# Patient Record
Sex: FEMALE | Race: WHITE | NOT HISPANIC OR LATINO | Employment: FULL TIME | URBAN - METROPOLITAN AREA
[De-identification: names, ages, dates, MRNs, and addresses within clinical notes are randomized per-mention and may not be internally consistent; named-entity substitution may affect disease eponyms.]

---

## 2018-08-25 ENCOUNTER — HOSPITAL ENCOUNTER (EMERGENCY)
Facility: HOSPITAL | Age: 43
Discharge: HOME | End: 2018-08-26
Attending: EMERGENCY MEDICINE
Payer: COMMERCIAL

## 2018-08-25 DIAGNOSIS — N30.00 ACUTE CYSTITIS WITHOUT HEMATURIA: Primary | ICD-10-CM

## 2018-08-25 DIAGNOSIS — E86.0 DEHYDRATION: ICD-10-CM

## 2018-08-25 DIAGNOSIS — D64.9 ANEMIA, UNSPECIFIED TYPE: ICD-10-CM

## 2018-08-25 DIAGNOSIS — K52.9 GASTROENTERITIS: ICD-10-CM

## 2018-08-25 LAB
ALBUMIN SERPL BCP-MCNC: 2.8 G/DL
ALP SERPL-CCNC: 114 U/L
ALT SERPL W/O P-5'-P-CCNC: 17 U/L
ANION GAP SERPL CALC-SCNC: 13 MMOL/L
ANISOCYTOSIS BLD QL SMEAR: SLIGHT
AST SERPL-CCNC: 19 U/L
BACTERIA #/AREA URNS AUTO: ABNORMAL /HPF
BASOPHILS # BLD AUTO: 0.03 K/UL
BASOPHILS NFR BLD: 0.2 %
BILIRUB SERPL-MCNC: 2.2 MG/DL
BILIRUB UR QL STRIP: NEGATIVE
BUN SERPL-MCNC: 12 MG/DL
CALCIUM SERPL-MCNC: 9 MG/DL
CHLORIDE SERPL-SCNC: 95 MMOL/L
CLARITY UR REFRACT.AUTO: ABNORMAL
CO2 SERPL-SCNC: 19 MMOL/L
COLOR UR AUTO: ABNORMAL
CREAT SERPL-MCNC: 1.3 MG/DL
DACRYOCYTES BLD QL SMEAR: ABNORMAL
DIFFERENTIAL METHOD: ABNORMAL
EOSINOPHIL # BLD AUTO: 0.1 K/UL
EOSINOPHIL NFR BLD: 0.3 %
ERYTHROCYTE [DISTWIDTH] IN BLOOD BY AUTOMATED COUNT: 20.1 %
EST. GFR  (AFRICAN AMERICAN): 58.9 ML/MIN/1.73 M^2
EST. GFR  (NON AFRICAN AMERICAN): 51.1 ML/MIN/1.73 M^2
GLUCOSE SERPL-MCNC: 160 MG/DL
GLUCOSE UR QL STRIP: NEGATIVE
HCT VFR BLD AUTO: 25.5 %
HGB BLD-MCNC: 7.3 G/DL
HGB UR QL STRIP: ABNORMAL
HYALINE CASTS UR QL AUTO: 0 /LPF
HYPOCHROMIA BLD QL SMEAR: ABNORMAL
KETONES UR QL STRIP: NEGATIVE
LACTATE SERPL-SCNC: 1.9 MMOL/L
LEUKOCYTE ESTERASE UR QL STRIP: ABNORMAL
LYMPHOCYTES # BLD AUTO: 0.9 K/UL
LYMPHOCYTES NFR BLD: 5.4 %
MCH RBC QN AUTO: 15.7 PG
MCHC RBC AUTO-ENTMCNC: 28.6 G/DL
MCV RBC AUTO: 55 FL
MICROSCOPIC COMMENT: ABNORMAL
MONOCYTES # BLD AUTO: 1.4 K/UL
MONOCYTES NFR BLD: 8.5 %
NEUTROPHILS # BLD AUTO: 13.5 K/UL
NEUTROPHILS NFR BLD: 85.6 %
NITRITE UR QL STRIP: POSITIVE
OVALOCYTES BLD QL SMEAR: ABNORMAL
PH UR STRIP: 7 [PH] (ref 5–8)
PLATELET # BLD AUTO: 250 K/UL
PLATELET BLD QL SMEAR: ABNORMAL
PMV BLD AUTO: ABNORMAL FL
POIKILOCYTOSIS BLD QL SMEAR: SLIGHT
POLYCHROMASIA BLD QL SMEAR: ABNORMAL
POTASSIUM SERPL-SCNC: 3.8 MMOL/L
PROT SERPL-MCNC: 6.7 G/DL
PROT UR QL STRIP: ABNORMAL
RBC # BLD AUTO: 4.65 M/UL
RBC #/AREA URNS AUTO: 0 /HPF (ref 0–4)
SODIUM SERPL-SCNC: 127 MMOL/L
SP GR UR STRIP: <=1.005 (ref 1–1.03)
SPHEROCYTES BLD QL SMEAR: ABNORMAL
URN SPEC COLLECT METH UR: ABNORMAL
UROBILINOGEN UR STRIP-ACNC: <2 EU/DL
WBC # BLD AUTO: 15.84 K/UL
WBC #/AREA URNS AUTO: >100 /HPF (ref 0–5)

## 2018-08-25 PROCEDURE — 81000 URINALYSIS NONAUTO W/SCOPE: CPT

## 2018-08-25 PROCEDURE — 80053 COMPREHEN METABOLIC PANEL: CPT

## 2018-08-25 PROCEDURE — 85025 COMPLETE CBC W/AUTO DIFF WBC: CPT

## 2018-08-25 PROCEDURE — 83605 ASSAY OF LACTIC ACID: CPT

## 2018-08-25 PROCEDURE — 63600175 PHARM REV CODE 636 W HCPCS: Performed by: EMERGENCY MEDICINE

## 2018-08-25 PROCEDURE — 25000003 PHARM REV CODE 250: Performed by: EMERGENCY MEDICINE

## 2018-08-25 PROCEDURE — 99284 EMERGENCY DEPT VISIT MOD MDM: CPT | Mod: 25

## 2018-08-25 PROCEDURE — 87077 CULTURE AEROBIC IDENTIFY: CPT

## 2018-08-25 PROCEDURE — 96365 THER/PROPH/DIAG IV INF INIT: CPT

## 2018-08-25 PROCEDURE — 87040 BLOOD CULTURE FOR BACTERIA: CPT

## 2018-08-25 PROCEDURE — 96376 TX/PRO/DX INJ SAME DRUG ADON: CPT

## 2018-08-25 PROCEDURE — 96375 TX/PRO/DX INJ NEW DRUG ADDON: CPT

## 2018-08-25 PROCEDURE — 96361 HYDRATE IV INFUSION ADD-ON: CPT

## 2018-08-25 PROCEDURE — 85610 PROTHROMBIN TIME: CPT

## 2018-08-25 PROCEDURE — 87186 SC STD MICRODIL/AGAR DIL: CPT

## 2018-08-25 RX ORDER — ACETAMINOPHEN 500 MG
1000 TABLET ORAL
Status: DISCONTINUED | OUTPATIENT
Start: 2018-08-25 | End: 2018-08-25

## 2018-08-25 RX ORDER — IBUPROFEN 200 MG
400 TABLET ORAL
Status: COMPLETED | OUTPATIENT
Start: 2018-08-25 | End: 2018-08-25

## 2018-08-25 RX ORDER — ONDANSETRON 2 MG/ML
4 INJECTION INTRAMUSCULAR; INTRAVENOUS
Status: COMPLETED | OUTPATIENT
Start: 2018-08-25 | End: 2018-08-25

## 2018-08-25 RX ORDER — ONDANSETRON 2 MG/ML
4 INJECTION INTRAMUSCULAR; INTRAVENOUS
Status: COMPLETED | OUTPATIENT
Start: 2018-08-26 | End: 2018-08-26

## 2018-08-25 RX ORDER — ACETAMINOPHEN 325 MG/1
975 TABLET ORAL
Status: COMPLETED | OUTPATIENT
Start: 2018-08-25 | End: 2018-08-25

## 2018-08-25 RX ORDER — LEVOFLOXACIN 500 MG/1
250 TABLET, FILM COATED ORAL DAILY
Qty: 5 TABLET | Refills: 0 | Status: SHIPPED | OUTPATIENT
Start: 2018-08-25 | End: 2018-08-30

## 2018-08-25 RX ADMIN — IBUPROFEN 400 MG: 200 TABLET, FILM COATED ORAL at 10:08

## 2018-08-25 RX ADMIN — SODIUM CHLORIDE 3321 ML: 0.9 INJECTION, SOLUTION INTRAVENOUS at 07:08

## 2018-08-25 RX ADMIN — ACETAMINOPHEN 975 MG: 325 TABLET, FILM COATED ORAL at 08:08

## 2018-08-25 RX ADMIN — ONDANSETRON 4 MG: 2 INJECTION, SOLUTION INTRAMUSCULAR; INTRAVENOUS at 08:08

## 2018-08-25 RX ADMIN — CEFTRIAXONE 1 G: 1 INJECTION, SOLUTION INTRAVENOUS at 10:08

## 2018-08-26 ENCOUNTER — TELEPHONE (OUTPATIENT)
Dept: EMERGENCY MEDICINE | Facility: HOSPITAL | Age: 43
End: 2018-08-26

## 2018-08-26 VITALS
HEART RATE: 82 BPM | TEMPERATURE: 99 F | HEIGHT: 70 IN | OXYGEN SATURATION: 95 % | WEIGHT: 244.06 LBS | SYSTOLIC BLOOD PRESSURE: 116 MMHG | RESPIRATION RATE: 18 BRPM | DIASTOLIC BLOOD PRESSURE: 56 MMHG | BODY MASS INDEX: 34.94 KG/M2

## 2018-08-26 LAB
INR PPP: 1.1
PROTHROMBIN TIME: 12 SEC

## 2018-08-26 PROCEDURE — 63600175 PHARM REV CODE 636 W HCPCS: Performed by: EMERGENCY MEDICINE

## 2018-08-26 RX ORDER — ONDANSETRON 4 MG/1
4 TABLET, ORALLY DISINTEGRATING ORAL EVERY 6 HOURS PRN
Qty: 12 TABLET | Refills: 0 | Status: SHIPPED | OUTPATIENT
Start: 2018-08-26

## 2018-08-26 RX ADMIN — ONDANSETRON 4 MG: 2 INJECTION, SOLUTION INTRAMUSCULAR; INTRAVENOUS at 12:08

## 2018-08-28 LAB
BACTERIA BLD CULT: NORMAL

## 2018-08-29 NOTE — ED PROVIDER NOTES
Encounter Date: 8/25/2018       History     Chief Complaint   Patient presents with    Fever     patient has been running fever since wednesday with nausea, vomiting and diarrhea.   states pt has not been getting out of the bed or eating anything over last few days.       Patient currently presents with concerns regarding fever.  Onset noted Wed.  Patient has not taken any antipyretics prior to arrival.  Sinus congestion is not reported.  Cough is not reported.  Patient reports associated nausea/vomiting and reports diarrhea.  Abdominal pain is not reported.  Urinary complaints are not present.  Significant fatigue and generalized weakness noted.  Little to no PO intake noted.          Review of patient's allergies indicates:   Allergen Reactions    Codeine      Past Medical History:   Diagnosis Date    Chronic back pain     Hypertension     Migraines     Obese     Spinal stenosis      Past Surgical History:   Procedure Laterality Date    COLPOSCOPY       Family History   Problem Relation Age of Onset    Dementia Mother     Frontotemporal dementia Mother     Lupus Mother     Vasculitis Mother     Vasculitis Father     Diabetes Father     Lupus Father      Social History     Tobacco Use    Smoking status: Current Every Day Smoker     Packs/day: 0.50     Types: Cigarettes    Smokeless tobacco: Never Used   Substance Use Topics    Alcohol use: No    Drug use: No     Review of Systems   Constitutional: Positive for fatigue and fever. Negative for chills.   HENT: Negative for congestion and rhinorrhea.    Respiratory: Negative for cough, chest tightness, shortness of breath and wheezing.    Cardiovascular: Negative for chest pain, palpitations and leg swelling.   Gastrointestinal: Positive for diarrhea, nausea and vomiting. Negative for abdominal pain and constipation.   Genitourinary: Negative for dysuria, frequency, urgency, vaginal bleeding and vaginal discharge.   Skin: Negative for color  "change and rash.   Allergic/Immunologic: Negative for immunocompromised state.   Neurological: Positive for weakness. Negative for dizziness and numbness.   Hematological: Negative for adenopathy. Does not bruise/bleed easily.   All other systems reviewed and are negative.      Physical Exam     Initial Vitals [08/25/18 1849]   BP Pulse Resp Temp SpO2   133/60 (!) 115 12 (!) 103.2 °F (39.6 °C) 97 %      MAP       --         Vitals:    08/25/18 1849 08/25/18 1916 08/25/18 2000 08/25/18 2030   BP: 133/60  125/61 (!) 112/53   Pulse: (!) 115 109 101 101   Resp: 12  16 18   Temp: (!) 103.2 °F (39.6 °C)      TempSrc: Oral      SpO2: 97%  (!) 94% 96%   Weight: 110.7 kg (244 lb 0.8 oz)      Height: 5' 10" (1.778 m)       08/25/18 2059 08/25/18 2101 08/25/18 2200 08/25/18 2202   BP:  (!) 115/57 (!) 125/57    Pulse:  109 94    Resp:  18 20    Temp: (!) 102.5 °F (39.2 °C)   (!) 101.5 °F (38.6 °C)   TempSrc:    Oral   SpO2:  99% 100%    Weight:       Height:        08/25/18 2237 08/25/18 2317 08/26/18 0000   BP:   (!) 116/56   Pulse:   82   Resp:   18   Temp: (!) 101.5 °F (38.6 °C) 99.3 °F (37.4 °C)    TempSrc:  Oral    SpO2:   95%   Weight:      Height:            Physical Exam    Nursing note and vitals reviewed.  Constitutional: She appears well-developed and well-nourished. She is not diaphoretic. No distress.   HENT:   Head: Normocephalic and atraumatic.   Right Ear: External ear normal.   Left Ear: External ear normal.   Nose: Nose normal.   Mouth/Throat: Oropharynx is clear and moist.   Eyes: Conjunctivae and EOM are normal. Pupils are equal, round, and reactive to light. No scleral icterus.   Neck: Neck supple. No tracheal deviation present. No JVD present.   Cardiovascular: Regular rhythm, normal heart sounds and intact distal pulses. Tachycardia present.  Exam reveals no gallop and no friction rub.    No murmur heard.  Pulmonary/Chest: Breath sounds normal. No respiratory distress. She has no wheezes. She has no " rhonchi. She has no rales.   Abdominal: Soft. Bowel sounds are normal. She exhibits no distension. There is no tenderness.   Musculoskeletal: Normal range of motion. She exhibits no edema.   Neurological: She is alert and oriented to person, place, and time. She has normal strength. No cranial nerve deficit or sensory deficit.   Skin: Skin is warm and dry. No rash noted.   Psychiatric: She has a normal mood and affect. Her behavior is normal.         ED Course   Procedures  Labs Reviewed   COMPREHENSIVE METABOLIC PANEL - Abnormal; Notable for the following components:       Result Value    Sodium 127 (*)     CO2 19 (*)     Glucose 160 (*)     Albumin 2.8 (*)     Total Bilirubin 2.2 (*)     eGFR if  58.9 (*)     eGFR if non  51.1 (*)     All other components within normal limits   CBC W/ AUTO DIFFERENTIAL - Abnormal; Notable for the following components:    WBC 15.84 (*)     Hemoglobin 7.3 (*)     Hematocrit 25.5 (*)     MCV 55 (*)     MCH 15.7 (*)     MCHC 28.6 (*)     RDW 20.1 (*)     Gran # (ANC) 13.5 (*)     Lymph # 0.9 (*)     Mono # 1.4 (*)     Gran% 85.6 (*)     Lymph% 5.4 (*)     All other components within normal limits   URINALYSIS - Abnormal; Notable for the following components:    Color, UA Red (*)     Appearance, UA Cloudy (*)     Specific Gravity, UA <=1.005 (*)     Protein, UA 2+ (*)     Occult Blood UA 2+ (*)     Nitrite, UA Positive (*)     Leukocytes, UA 3+ (*)     All other components within normal limits   URINALYSIS MICROSCOPIC - Abnormal; Notable for the following components:    WBC, UA >100 (*)     Bacteria, UA Many (*)     All other components within normal limits   CULTURE, BLOOD   CULTURE, BLOOD   LACTIC ACID, PLASMA   PROTIME-INR          Imaging Results          X-Ray Chest AP Portable (Final result)  Result time 08/25/18 19:35:14    Final result by Jeb Park MD (08/25/18 19:35:14)                 Impression:      No acute findings.  No  significant change since 09/17/2016.      Electronically signed by: Jeb Park MD  Date:    08/25/2018  Time:    19:35             Narrative:    EXAMINATION:  XR CHEST AP PORTABLE    CLINICAL HISTORY:  SIRS;    COMPARISON:  09/17/2016.    FINDINGS:  Lungs are clear.  Cardiac silhouette is enlarged.  Previously identified left-sided PICC line has been removed.  No significant bony findings.                                 Medical Decision Making:   ED Management:  All findings were reviewed with the patient/family in detail along with the diagnosis of anemia, UTI, and gastroenteritis with resultant dehydration.  At present she is feeling better with resolved dizziness and nausea.  I see no indication of an emergent process beyond that addressed during our encounter but have duly counseled the patient/family regarding the need for prompt follow-up as well as the indications that should prompt immediate return to the emergency room should new or worrisome developments occur.  The patient/family communicates understanding of all this information and all remaining questions and concerns were addressed at this time.                          Clinical Impression:   The primary encounter diagnosis was Acute cystitis without hematuria. Diagnoses of Dehydration, Gastroenteritis, and Anemia, unspecified type were also pertinent to this visit.                             Jagdish Jones MD  08/29/18 0524

## 2018-08-31 LAB — BACTERIA BLD CULT: NORMAL

## 2021-12-22 ENCOUNTER — HOSPITAL ENCOUNTER (EMERGENCY)
Facility: HOSPITAL | Age: 46
Discharge: HOME | End: 2021-12-22
Attending: EMERGENCY MEDICINE
Payer: COMMERCIAL

## 2021-12-22 VITALS
HEIGHT: 70 IN | DIASTOLIC BLOOD PRESSURE: 79 MMHG | SYSTOLIC BLOOD PRESSURE: 158 MMHG | RESPIRATION RATE: 18 BRPM | TEMPERATURE: 98 F | WEIGHT: 257.5 LBS | BODY MASS INDEX: 36.86 KG/M2 | HEART RATE: 97 BPM | OXYGEN SATURATION: 98 %

## 2021-12-22 DIAGNOSIS — S83.92XA SPRAIN OF LEFT KNEE, UNSPECIFIED LIGAMENT, INITIAL ENCOUNTER: ICD-10-CM

## 2021-12-22 DIAGNOSIS — R07.81 RIB PAIN ON RIGHT SIDE: ICD-10-CM
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
## 2021-12-22 DIAGNOSIS — S89.92XA LEFT KNEE INJURY: ICD-10-CM

## 2021-12-22 PROCEDURE — 99284 EMERGENCY DEPT VISIT MOD MDM: CPT | Mod: 25,ER

## 2021-12-22 PROCEDURE — 29505 APPLICATION LONG LEG SPLINT: CPT | Mod: LT,ER

## 2021-12-22 RX ORDER — TRAMADOL HYDROCHLORIDE 50 MG/1
50 TABLET ORAL EVERY 6 HOURS PRN
Qty: 12 TABLET | Refills: 0 | Status: SHIPPED | OUTPATIENT
Start: 2021-12-22
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
## 2024-10-21 ENCOUNTER — TELEPHONE (OUTPATIENT)
Dept: INTERNAL MEDICINE | Facility: CLINIC | Age: 49
End: 2024-10-21
Payer: COMMERCIAL

## 2024-10-21 NOTE — TELEPHONE ENCOUNTER
Spoke with the patient stated that she needed to establish care with a new primary care doctor. The patient was informed that Dr Brown did not have any sooner appointment. The patient deny the appointment.

## 2024-10-21 NOTE — TELEPHONE ENCOUNTER
----- Message from Roya sent at 10/21/2024 11:07 AM CDT -----  Type:  Sooner Appointment Request    Caller is requesting a sooner appointment.  Caller declined first available appointment listed below.  Caller will not accept being placed on the waitlist and is requesting a message be sent to doctor.  Name of Caller:Delicia  When is the first available appointment?n/a  Symptoms:Get Est  Would the patient rather a call back or a response via [facility]? Callback  Best Call Back Number:[phone]  Additional Information: [facility]

## 2025-07-20 ENCOUNTER — HOSPITAL ENCOUNTER (INPATIENT)
Facility: HOSPITAL | Age: 50
LOS: 3 days | Discharge: LEFT BEFORE DISCHARGE | DRG: 871 | End: 2025-07-23
Attending: EMERGENCY MEDICINE | Admitting: SPECIALIST
Payer: COMMERCIAL

## 2025-07-20 DIAGNOSIS — I95.9 HYPOTENSION, UNSPECIFIED HYPOTENSION TYPE: ICD-10-CM

## 2025-07-20 DIAGNOSIS — D64.9 ANEMIA, UNSPECIFIED TYPE: ICD-10-CM

## 2025-07-20 DIAGNOSIS — N17.9 AKI (ACUTE KIDNEY INJURY): Primary | ICD-10-CM

## 2025-07-20 DIAGNOSIS — N30.00 ACUTE CYSTITIS WITHOUT HEMATURIA: ICD-10-CM

## 2025-07-20 DIAGNOSIS — Z13.6 SCREENING FOR CARDIOVASCULAR CONDITION: ICD-10-CM

## 2025-07-20 DIAGNOSIS — A41.9 SEPSIS, DUE TO UNSPECIFIED ORGANISM, UNSPECIFIED WHETHER ACUTE ORGAN DYSFUNCTION PRESENT: ICD-10-CM

## 2025-07-20 PROBLEM — F17.200 TOBACCO DEPENDENCY: Status: ACTIVE | Noted: 2025-07-20

## 2025-07-20 PROBLEM — G89.4 CHRONIC PAIN SYNDROME: Status: ACTIVE | Noted: 2025-07-20

## 2025-07-20 PROBLEM — E27.40 ADRENAL INSUFFICIENCY: Status: ACTIVE | Noted: 2025-07-20

## 2025-07-20 PROBLEM — N10 ACUTE PYELONEPHRITIS: Status: ACTIVE | Noted: 2025-07-20

## 2025-07-20 LAB
ABO + RH BLD: NORMAL
ABORH RETYPE: NORMAL
ABSOLUTE EOSINOPHIL (OHS): 0 K/UL
ABSOLUTE EOSINOPHIL (OHS): 0.04 K/UL
ABSOLUTE MONOCYTE (OHS): 0.48 K/UL (ref 0.3–1)
ABSOLUTE MONOCYTE (OHS): 0.57 K/UL (ref 0.3–1)
ABSOLUTE NEUTROPHIL COUNT (OHS): 17.14 K/UL (ref 1.8–7.7)
ABSOLUTE NEUTROPHIL COUNT (OHS): 17.96 K/UL (ref 1.8–7.7)
ALBUMIN SERPL BCP-MCNC: 2.7 G/DL (ref 3.5–5.2)
ALLENS TEST: ABNORMAL
ALP SERPL-CCNC: 107 UNIT/L (ref 40–150)
ALT SERPL W/O P-5'-P-CCNC: 35 UNIT/L (ref 10–44)
ANION GAP (OHS): 16 MMOL/L (ref 8–16)
ANISOCYTOSIS BLD QL SMEAR: NORMAL
AST SERPL-CCNC: 36 UNIT/L (ref 11–45)
BACTERIA #/AREA URNS HPF: ABNORMAL /HPF
BASOPHILS # BLD AUTO: 0.02 K/UL
BASOPHILS # BLD AUTO: 0.04 K/UL
BASOPHILS NFR BLD AUTO: 0.1 %
BASOPHILS NFR BLD AUTO: 0.2 %
BILIRUB SERPL-MCNC: 0.8 MG/DL (ref 0.1–1)
BILIRUB UR QL STRIP.AUTO: ABNORMAL
BLD PROD TYP BPU: NORMAL
BLOOD UNIT EXPIRATION DATE: NORMAL
BLOOD UNIT TYPE CODE: 5100
BNP SERPL-MCNC: 35 PG/ML (ref 0–99)
BUN SERPL-MCNC: 33 MG/DL (ref 6–20)
CALCIUM SERPL-MCNC: 10.1 MG/DL (ref 8.7–10.5)
CHLORIDE SERPL-SCNC: 95 MMOL/L (ref 95–110)
CLARITY UR: CLEAR
CO2 SERPL-SCNC: 19 MMOL/L (ref 23–29)
COLOR UR AUTO: YELLOW
CORTIS SERPL-MCNC: 15.1 UG/DL
CREAT SERPL-MCNC: 3.4 MG/DL (ref 0.5–1.4)
CROSSMATCH INTERPRETATION: NORMAL
DACRYOCYTES BLD QL SMEAR: NORMAL
DELSYS: ABNORMAL
DISPENSE STATUS: NORMAL
ERYTHROCYTE [DISTWIDTH] IN BLOOD BY AUTOMATED COUNT: 20.4 % (ref 11.5–14.5)
ERYTHROCYTE [DISTWIDTH] IN BLOOD BY AUTOMATED COUNT: 22.1 % (ref 11.5–14.5)
GFR SERPLBLD CREATININE-BSD FMLA CKD-EPI: 16 ML/MIN/1.73/M2
GLUCOSE SERPL-MCNC: 148 MG/DL (ref 70–110)
GLUCOSE UR QL STRIP: NEGATIVE
HCO3 UR-SCNC: 19.6 MMOL/L (ref 24–28)
HCT VFR BLD AUTO: 22.2 % (ref 37–48.5)
HCT VFR BLD AUTO: 22.2 % (ref 37–48.5)
HCV AB SERPL QL IA: NEGATIVE
HGB BLD-MCNC: 6.4 GM/DL (ref 12–16)
HGB BLD-MCNC: 6.4 GM/DL (ref 12–16)
HGB UR QL STRIP: NEGATIVE
HIV 1+2 AB+HIV1 P24 AG SERPL QL IA: NEGATIVE
HOLD SPECIMEN: NORMAL
HYPOCHROMIA BLD QL SMEAR: NORMAL
IMM GRANULOCYTES # BLD AUTO: 0.37 K/UL (ref 0–0.04)
IMM GRANULOCYTES # BLD AUTO: 0.48 K/UL (ref 0–0.04)
IMM GRANULOCYTES NFR BLD AUTO: 2 % (ref 0–0.5)
IMM GRANULOCYTES NFR BLD AUTO: 2.4 % (ref 0–0.5)
INDIRECT COOMBS: NORMAL
KETONES UR QL STRIP: NEGATIVE
LACTATE SERPL-SCNC: 1.1 MMOL/L (ref 0.5–2.2)
LACTATE SERPL-SCNC: 1.7 MMOL/L (ref 0.5–2.2)
LACTATE SERPL-SCNC: 2.7 MMOL/L (ref 0.5–2.2)
LEUKOCYTE ESTERASE UR QL STRIP: ABNORMAL
LYMPHOCYTES # BLD AUTO: 0.35 K/UL (ref 1–4.8)
LYMPHOCYTES # BLD AUTO: 1 K/UL (ref 1–4.8)
MCH RBC QN AUTO: 17.3 PG (ref 27–31)
MCH RBC QN AUTO: 18.3 PG (ref 27–31)
MCHC RBC AUTO-ENTMCNC: 28.8 G/DL (ref 32–36)
MCHC RBC AUTO-ENTMCNC: 28.8 G/DL (ref 32–36)
MCV RBC AUTO: 60 FL (ref 82–98)
MCV RBC AUTO: 63 FL (ref 82–98)
MICROSCOPIC COMMENT: ABNORMAL
MODE: ABNORMAL
NITRITE UR QL STRIP: POSITIVE
NUCLEATED RBC (/100WBC) (OHS): 0 /100 WBC
NUCLEATED RBC (/100WBC) (OHS): 0 /100 WBC
OHS QRS DURATION: 80 MS
OHS QTC CALCULATION: 423 MS
OVALOCYTES BLD QL SMEAR: NORMAL
PCO2 BLDA: 41 MMHG (ref 35–45)
PH SMN: 7.29 [PH] (ref 7.35–7.45)
PH UR STRIP: 6 [PH]
PLATELET # BLD AUTO: 347 K/UL (ref 150–450)
PLATELET # BLD AUTO: 416 K/UL (ref 150–450)
PLATELET BLD QL SMEAR: NORMAL
PMV BLD AUTO: 10.9 FL (ref 9.2–12.9)
PMV BLD AUTO: 9.8 FL (ref 9.2–12.9)
PO2 BLDA: 32 MMHG (ref 40–60)
POC BE: -7 MMOL/L (ref -2–2)
POC SATURATED O2: 55 % (ref 95–100)
POIKILOCYTOSIS BLD QL SMEAR: SLIGHT
POTASSIUM SERPL-SCNC: 4 MMOL/L (ref 3.5–5.1)
PROCALCITONIN SERPL-MCNC: 22.96 NG/ML
PROT SERPL-MCNC: 7 GM/DL (ref 6–8.4)
PROT UR QL STRIP: ABNORMAL
RBC # BLD AUTO: 3.5 M/UL (ref 4–5.4)
RBC # BLD AUTO: 3.69 M/UL (ref 4–5.4)
RBC #/AREA URNS HPF: 1 /HPF (ref 0–4)
RELATIVE EOSINOPHIL (OHS): 0 %
RELATIVE EOSINOPHIL (OHS): 0.2 %
RELATIVE LYMPHOCYTE (OHS): 1.9 % (ref 18–48)
RELATIVE LYMPHOCYTE (OHS): 5 % (ref 18–48)
RELATIVE MONOCYTE (OHS): 2.4 % (ref 4–15)
RELATIVE MONOCYTE (OHS): 3.1 % (ref 4–15)
RELATIVE NEUTROPHIL (OHS): 89.8 % (ref 38–73)
RELATIVE NEUTROPHIL (OHS): 92.9 % (ref 38–73)
RH BLD: NORMAL
SAMPLE: ABNORMAL
SITE: ABNORMAL
SODIUM SERPL-SCNC: 130 MMOL/L (ref 136–145)
SP GR UR STRIP: 1.01
SPECIMEN OUTDATE: NORMAL
TROPONIN I SERPL DL<=0.01 NG/ML-MCNC: 0.01 NG/ML
UNIT NUMBER: NORMAL
UROBILINOGEN UR STRIP-ACNC: NEGATIVE EU/DL
WBC # BLD AUTO: 18.45 K/UL (ref 3.9–12.7)
WBC # BLD AUTO: 20 K/UL (ref 3.9–12.7)
WBC #/AREA URNS HPF: 12 /HPF (ref 0–5)

## 2025-07-20 PROCEDURE — 85025 COMPLETE CBC W/AUTO DIFF WBC: CPT | Performed by: SPECIALIST

## 2025-07-20 PROCEDURE — 99900035 HC TECH TIME PER 15 MIN (STAT): Mod: ER

## 2025-07-20 PROCEDURE — 36600 WITHDRAWAL OF ARTERIAL BLOOD: CPT | Mod: ER

## 2025-07-20 PROCEDURE — 25000003 PHARM REV CODE 250: Mod: ER | Performed by: EMERGENCY MEDICINE

## 2025-07-20 PROCEDURE — 36430 TRANSFUSION BLD/BLD COMPNT: CPT

## 2025-07-20 PROCEDURE — 83605 ASSAY OF LACTIC ACID: CPT | Mod: ER | Performed by: EMERGENCY MEDICINE

## 2025-07-20 PROCEDURE — S4991 NICOTINE PATCH NONLEGEND: HCPCS | Performed by: FAMILY MEDICINE

## 2025-07-20 PROCEDURE — 82803 BLOOD GASES ANY COMBINATION: CPT | Mod: ER

## 2025-07-20 PROCEDURE — 93010 ELECTROCARDIOGRAM REPORT: CPT | Mod: ,,, | Performed by: INTERNAL MEDICINE

## 2025-07-20 PROCEDURE — 87389 HIV-1 AG W/HIV-1&-2 AB AG IA: CPT | Performed by: EMERGENCY MEDICINE

## 2025-07-20 PROCEDURE — 84484 ASSAY OF TROPONIN QUANT: CPT | Performed by: EMERGENCY MEDICINE

## 2025-07-20 PROCEDURE — 87186 SC STD MICRODIL/AGAR DIL: CPT | Performed by: EMERGENCY MEDICINE

## 2025-07-20 PROCEDURE — 87088 URINE BACTERIA CULTURE: CPT | Performed by: EMERGENCY MEDICINE

## 2025-07-20 PROCEDURE — 36556 INSERT NON-TUNNEL CV CATH: CPT | Mod: RT

## 2025-07-20 PROCEDURE — 25000003 PHARM REV CODE 250

## 2025-07-20 PROCEDURE — 93005 ELECTROCARDIOGRAM TRACING: CPT | Mod: ER

## 2025-07-20 PROCEDURE — 86803 HEPATITIS C AB TEST: CPT | Performed by: EMERGENCY MEDICINE

## 2025-07-20 PROCEDURE — 51702 INSERT TEMP BLADDER CATH: CPT

## 2025-07-20 PROCEDURE — 96375 TX/PRO/DX INJ NEW DRUG ADDON: CPT

## 2025-07-20 PROCEDURE — 63600175 PHARM REV CODE 636 W HCPCS: Performed by: SPECIALIST

## 2025-07-20 PROCEDURE — 81003 URINALYSIS AUTO W/O SCOPE: CPT | Mod: ER | Performed by: EMERGENCY MEDICINE

## 2025-07-20 PROCEDURE — 83880 ASSAY OF NATRIURETIC PEPTIDE: CPT | Mod: ER | Performed by: EMERGENCY MEDICINE

## 2025-07-20 PROCEDURE — 27000221 HC OXYGEN, UP TO 24 HOURS: Mod: ER

## 2025-07-20 PROCEDURE — 86920 COMPATIBILITY TEST SPIN: CPT | Performed by: SPECIALIST

## 2025-07-20 PROCEDURE — 83605 ASSAY OF LACTIC ACID: CPT | Performed by: EMERGENCY MEDICINE

## 2025-07-20 PROCEDURE — 82962 GLUCOSE BLOOD TEST: CPT

## 2025-07-20 PROCEDURE — 96361 HYDRATE IV INFUSION ADD-ON: CPT

## 2025-07-20 PROCEDURE — P9016 RBC LEUKOCYTES REDUCED: HCPCS | Performed by: SPECIALIST

## 2025-07-20 PROCEDURE — 36415 COLL VENOUS BLD VENIPUNCTURE: CPT | Performed by: EMERGENCY MEDICINE

## 2025-07-20 PROCEDURE — 06H033Z INSERTION OF INFUSION DEVICE INTO INFERIOR VENA CAVA, PERCUTANEOUS APPROACH: ICD-10-PCS | Performed by: EMERGENCY MEDICINE

## 2025-07-20 PROCEDURE — 80053 COMPREHEN METABOLIC PANEL: CPT | Mod: ER | Performed by: EMERGENCY MEDICINE

## 2025-07-20 PROCEDURE — 84145 PROCALCITONIN (PCT): CPT | Mod: ER | Performed by: EMERGENCY MEDICINE

## 2025-07-20 PROCEDURE — 20000000 HC ICU ROOM

## 2025-07-20 PROCEDURE — 87154 CUL TYP ID BLD PTHGN 6+ TRGT: CPT | Performed by: EMERGENCY MEDICINE

## 2025-07-20 PROCEDURE — 99291 CRITICAL CARE FIRST HOUR: CPT

## 2025-07-20 PROCEDURE — 86901 BLOOD TYPING SEROLOGIC RH(D): CPT | Performed by: FAMILY MEDICINE

## 2025-07-20 PROCEDURE — 82533 TOTAL CORTISOL: CPT | Performed by: EMERGENCY MEDICINE

## 2025-07-20 PROCEDURE — 25000003 PHARM REV CODE 250: Performed by: SPECIALIST

## 2025-07-20 PROCEDURE — 96374 THER/PROPH/DIAG INJ IV PUSH: CPT

## 2025-07-20 PROCEDURE — 63600175 PHARM REV CODE 636 W HCPCS: Mod: ER

## 2025-07-20 PROCEDURE — 85025 COMPLETE CBC W/AUTO DIFF WBC: CPT | Mod: ER | Performed by: EMERGENCY MEDICINE

## 2025-07-20 PROCEDURE — 86920 COMPATIBILITY TEST SPIN: CPT

## 2025-07-20 PROCEDURE — 63600175 PHARM REV CODE 636 W HCPCS: Mod: ER | Performed by: EMERGENCY MEDICINE

## 2025-07-20 PROCEDURE — 25000003 PHARM REV CODE 250: Performed by: FAMILY MEDICINE

## 2025-07-20 PROCEDURE — 94761 N-INVAS EAR/PLS OXIMETRY MLT: CPT | Mod: ER

## 2025-07-20 RX ORDER — MUPIROCIN 20 MG/G
OINTMENT TOPICAL 2 TIMES DAILY
Status: DISCONTINUED | OUTPATIENT
Start: 2025-07-20 | End: 2025-07-23 | Stop reason: HOSPADM

## 2025-07-20 RX ORDER — NALOXONE HYDROCHLORIDE 1 MG/ML
INJECTION INTRAMUSCULAR; INTRAVENOUS; SUBCUTANEOUS
Status: COMPLETED
Start: 2025-07-20 | End: 2025-07-20

## 2025-07-20 RX ORDER — NALOXONE HCL 0.4 MG/ML
1 VIAL (ML) INJECTION
Status: COMPLETED | OUTPATIENT
Start: 2025-07-20 | End: 2025-07-20

## 2025-07-20 RX ORDER — GABAPENTIN 100 MG/1
200 CAPSULE ORAL 3 TIMES DAILY
Status: DISCONTINUED | OUTPATIENT
Start: 2025-07-20 | End: 2025-07-23 | Stop reason: HOSPADM

## 2025-07-20 RX ORDER — FAMOTIDINE 20 MG/1
20 TABLET, FILM COATED ORAL DAILY
Status: DISCONTINUED | OUTPATIENT
Start: 2025-07-20 | End: 2025-07-23 | Stop reason: HOSPADM

## 2025-07-20 RX ORDER — CEFEPIME HYDROCHLORIDE 2 G/1
2 INJECTION, POWDER, FOR SOLUTION INTRAVENOUS
Status: COMPLETED | OUTPATIENT
Start: 2025-07-20 | End: 2025-07-20

## 2025-07-20 RX ORDER — ALUMINUM HYDROXIDE, MAGNESIUM HYDROXIDE, AND SIMETHICONE 1200; 120; 1200 MG/30ML; MG/30ML; MG/30ML
30 SUSPENSION ORAL
Status: DISCONTINUED | OUTPATIENT
Start: 2025-07-20 | End: 2025-07-23 | Stop reason: HOSPADM

## 2025-07-20 RX ORDER — SODIUM CHLORIDE 9 MG/ML
1000 INJECTION, SOLUTION INTRAVENOUS
Status: COMPLETED | OUTPATIENT
Start: 2025-07-20 | End: 2025-07-20

## 2025-07-20 RX ORDER — MORPHINE SULFATE 4 MG/ML
1 INJECTION, SOLUTION INTRAMUSCULAR; INTRAVENOUS EVERY 6 HOURS PRN
Status: DISCONTINUED | OUTPATIENT
Start: 2025-07-20 | End: 2025-07-23

## 2025-07-20 RX ORDER — IBUPROFEN 200 MG
1 TABLET ORAL DAILY
Status: DISCONTINUED | OUTPATIENT
Start: 2025-07-20 | End: 2025-07-23 | Stop reason: HOSPADM

## 2025-07-20 RX ORDER — HYDROCODONE BITARTRATE AND ACETAMINOPHEN 500; 5 MG/1; MG/1
TABLET ORAL
Status: DISCONTINUED | OUTPATIENT
Start: 2025-07-20 | End: 2025-07-20

## 2025-07-20 RX ORDER — SUCRALFATE 1 G/10ML
1 SUSPENSION ORAL EVERY 6 HOURS
Status: DISCONTINUED | OUTPATIENT
Start: 2025-07-20 | End: 2025-07-23 | Stop reason: HOSPADM

## 2025-07-20 RX ORDER — ALUMINUM HYDROXIDE, MAGNESIUM HYDROXIDE, AND SIMETHICONE 1200; 120; 1200 MG/30ML; MG/30ML; MG/30ML
30 SUSPENSION ORAL
Status: DISCONTINUED | OUTPATIENT
Start: 2025-07-20 | End: 2025-07-20 | Stop reason: SDUPTHER

## 2025-07-20 RX ORDER — SUCRALFATE 1 G/10ML
1 SUSPENSION ORAL EVERY 6 HOURS
Status: DISCONTINUED | OUTPATIENT
Start: 2025-07-20 | End: 2025-07-20 | Stop reason: SDUPTHER

## 2025-07-20 RX ORDER — HYDROCODONE BITARTRATE AND ACETAMINOPHEN 500; 5 MG/1; MG/1
TABLET ORAL
Status: DISCONTINUED | OUTPATIENT
Start: 2025-07-20 | End: 2025-07-23 | Stop reason: HOSPADM

## 2025-07-20 RX ORDER — HYDROCODONE BITARTRATE AND ACETAMINOPHEN 5; 325 MG/1; MG/1
1 TABLET ORAL EVERY 8 HOURS PRN
Refills: 0 | Status: DISCONTINUED | OUTPATIENT
Start: 2025-07-20 | End: 2025-07-23 | Stop reason: HOSPADM

## 2025-07-20 RX ORDER — LANOLIN ALCOHOL/MO/W.PET/CERES
1 CREAM (GRAM) TOPICAL DAILY
Status: DISCONTINUED | OUTPATIENT
Start: 2025-07-21 | End: 2025-07-23 | Stop reason: HOSPADM

## 2025-07-20 RX ORDER — MEROPENEM 500 MG/1
500 INJECTION, POWDER, FOR SOLUTION INTRAVENOUS
Status: DISCONTINUED | OUTPATIENT
Start: 2025-07-20 | End: 2025-07-21

## 2025-07-20 RX ORDER — DEXAMETHASONE SODIUM PHOSPHATE 4 MG/ML
6 INJECTION, SOLUTION INTRA-ARTICULAR; INTRALESIONAL; INTRAMUSCULAR; INTRAVENOUS; SOFT TISSUE
Status: COMPLETED | OUTPATIENT
Start: 2025-07-20 | End: 2025-07-20

## 2025-07-20 RX ADMIN — SODIUM CHLORIDE 1000 ML: 9 INJECTION, SOLUTION INTRAVENOUS at 09:07

## 2025-07-20 RX ADMIN — HYDROCORTISONE SODIUM SUCCINATE 100 MG: 100 INJECTION, POWDER, FOR SOLUTION INTRAMUSCULAR; INTRAVENOUS at 09:07

## 2025-07-20 RX ADMIN — DEXAMETHASONE SODIUM PHOSPHATE 6 MG: 4 INJECTION INTRA-ARTICULAR; INTRALESIONAL; INTRAMUSCULAR; INTRAVENOUS; SOFT TISSUE at 07:07

## 2025-07-20 RX ADMIN — ALUMINUM HYDROXIDE, MAGNESIUM HYDROXIDE, AND DIMETHICONE 30 ML: 200; 20; 200 SUSPENSION ORAL at 05:07

## 2025-07-20 RX ADMIN — ALUMINUM HYDROXIDE, MAGNESIUM HYDROXIDE, AND DIMETHICONE 30 ML: 200; 20; 200 SUSPENSION ORAL at 11:07

## 2025-07-20 RX ADMIN — CEFEPIME 2 G: 2 INJECTION, POWDER, FOR SOLUTION INTRAVENOUS at 07:07

## 2025-07-20 RX ADMIN — MUPIROCIN: 20 OINTMENT TOPICAL at 11:07

## 2025-07-20 RX ADMIN — SUCRALFATE 1 G: 1 SUSPENSION ORAL at 05:07

## 2025-07-20 RX ADMIN — HYDROCORTISONE SODIUM SUCCINATE 100 MG: 100 INJECTION, POWDER, FOR SOLUTION INTRAMUSCULAR; INTRAVENOUS at 02:07

## 2025-07-20 RX ADMIN — MEROPENEM 500 MG: 500 INJECTION INTRAVENOUS at 11:07

## 2025-07-20 RX ADMIN — NALOXONE HYDROCHLORIDE 1 MG: 1 INJECTION PARENTERAL at 06:07

## 2025-07-20 RX ADMIN — HYDROCODONE BITARTRATE AND ACETAMINOPHEN 1 TABLET: 5; 325 TABLET ORAL at 08:07

## 2025-07-20 RX ADMIN — SUCRALFATE 1 G: 1 SUSPENSION ORAL at 11:07

## 2025-07-20 RX ADMIN — GABAPENTIN 200 MG: 100 CAPSULE ORAL at 08:07

## 2025-07-20 RX ADMIN — NICOTINE 1 PATCH: 14 PATCH, EXTENDED RELEASE TRANSDERMAL at 11:07

## 2025-07-20 RX ADMIN — SODIUM CHLORIDE 1000 ML: 9 INJECTION, SOLUTION INTRAVENOUS at 07:07

## 2025-07-20 RX ADMIN — GABAPENTIN 200 MG: 100 CAPSULE ORAL at 02:07

## 2025-07-20 RX ADMIN — SODIUM CHLORIDE 2145 ML: 9 INJECTION, SOLUTION INTRAVENOUS at 06:07

## 2025-07-20 RX ADMIN — FAMOTIDINE 20 MG: 20 TABLET, FILM COATED ORAL at 11:07

## 2025-07-20 RX ADMIN — ALUMINUM HYDROXIDE, MAGNESIUM HYDROXIDE, AND DIMETHICONE 30 ML: 200; 20; 200 SUSPENSION ORAL at 08:07

## 2025-07-20 RX ADMIN — MUPIROCIN: 20 OINTMENT TOPICAL at 08:07

## 2025-07-20 NOTE — ED PROVIDER NOTES
Encounter Date: 7/20/2025       History     Chief Complaint   Patient presents with    Back Pain     Reports back pain from previous injury. Lower back pain. Appears drowsy and weak.      The history is provided by the patient.   Pt reports feeling weak, dizziness, complains of chronic low back, has not been eating or drinking enough for several days and  reports she has been taking Norco as scheduled. Pt c recent UTI.    Review of patient's allergies indicates:   Allergen Reactions    Codeine      Past Medical History:   Diagnosis Date    Chronic back pain     Hypertension     Migraines     Obese     Spinal stenosis      Past Surgical History:   Procedure Laterality Date    COLPOSCOPY       Family History   Problem Relation Name Age of Onset    Dementia Mother      Frontotemporal dementia Mother      Lupus Mother      Vasculitis Mother      Vasculitis Father      Diabetes Father      Lupus Father       Social History[1]  Review of Systems   Constitutional:  Negative for fever.   HENT:  Negative for congestion.    Eyes:  Negative for visual disturbance.   Cardiovascular:  Negative for chest pain.   Gastrointestinal:  Negative for abdominal pain.   Genitourinary:  Negative for dysuria.   Musculoskeletal:  Positive for back pain.   Neurological:  Positive for light-headedness. Negative for speech difficulty.       Physical Exam     Initial Vitals [07/20/25 0641]   BP Pulse Resp Temp SpO2   (!) 83/44 99 20 98.5 °F (36.9 °C) 97 %      MAP       --         Physical Exam    Nursing note and vitals reviewed.  Constitutional: She appears well-developed and well-nourished. No distress.   Obesity   HENT:   Head: Normocephalic and atraumatic. Mouth/Throat: Oropharynx is clear and moist.   Eyes: Conjunctivae and EOM are normal. Pupils are equal, round, and reactive to light.   Neck: Neck supple.   Normal range of motion.  Cardiovascular:  Normal rate, regular rhythm and normal heart sounds.           Pulmonary/Chest:  Breath sounds normal. No respiratory distress.   Abdominal: Abdomen is soft. Bowel sounds are normal. She exhibits no distension. There is no abdominal tenderness.   Musculoskeletal:         General: Normal range of motion.      Cervical back: Normal range of motion and neck supple.      Comments: Somnolent     Neurological: She is alert and oriented to person, place, and time. She has normal strength.   Skin: Skin is warm and dry.   Psychiatric: She has a normal mood and affect. Thought content normal.         ED Course   Central Line    Date/Time: 7/20/2025 8:54 AM    Performed by: Antwon Coates MD  Authorized by: Antwon Coates MD    Location procedure was performed:  [facility] EMERGENCY DEPARTMENT  Indications:  Med administration and vascular access  Preparation:  Skin prepped with ChloraPrep  Skin prep agent dried: Skin prep agent completely dried prior to procedure    Sterile barriers: All five maximal sterile barriers used - gloves, gown, cap, mask and large sterile sheet    Hand hygiene: Hand hygiene performed immediately prior to central venous catheter insertion    Location:  Right femoral  Site selection rationale:  Body habitus  Catheter type:  Triple lumen  Catheter size:  7 Fr  Inserted Catheter Length (cm):  16  Ultrasound guidance: Yes    Manometry: No    Number of attempts:  2  Securement:  Line sutured, chlorhexidine patch, sterile dressing applied and blood return through all ports  Complications: No    Guidewire: guidewire removed intact    Adverse Events:  NoneTermination Site: inferior vena cava  Critical Care    Date/Time: 7/20/2025 9:04 AM    Performed by: Antwon Coates MD  Authorized by: Antwon Coates MD  Direct patient critical care time: 10 minutes  Additional history critical care time: 7 minutes  Ordering / reviewing critical care time: 12 minutes  Documentation critical care time: 12 minutes  Consulting other physicians critical care time: 5  minutes  Total critical care time (exclusive of procedural time) : 46 minutes  Critical care time was exclusive of separately billable procedures and treating other patients.  Critical care was necessary to treat or prevent imminent or life-threatening deterioration of the following conditions: sepsis, circulatory failure and dehydration.  Critical care was time spent personally by me on the following activities: blood draw for specimens, development of treatment plan with patient or surrogate, discussions with consultants, examination of patient, evaluation of patient's response to treatment, obtaining history from patient or surrogate, ordering and performing treatments and interventions, ordering and review of laboratory studies, ordering and review of radiographic studies, pulse oximetry, re-evaluation of patient's condition and review of old charts.        Labs Reviewed   COMPREHENSIVE METABOLIC PANEL - Abnormal       Result Value    Sodium 130 (*)     Potassium 4.0      Chloride 95      CO2 19 (*)     Glucose 148 (*)     BUN 33 (*)     Creatinine 3.4 (*)     Calcium 10.1      Protein Total 7.0      Albumin 2.7 (*)     Bilirubin Total 0.8            AST 36      ALT 35      Anion Gap 16      eGFR 16 (*)    LACTIC ACID, PLASMA - Abnormal    Lactic Acid Level 2.7 (*)     Narrative:     Falsely low lactic acid results can be found in samples containing >=13.0 mg/dL total bilirubin and/or >=3.5 mg/dL direct bilirubin.    URINALYSIS, REFLEX TO URINE CULTURE - Abnormal    Color, UA Yellow      Appearance, UA Clear      pH, UA 6.0      Spec Grav UA 1.010      Protein, UA Trace (*)     Glucose, UA Negative      Ketones, UA Negative      Bilirubin, UA 3+ (*)     Blood, UA Negative      Nitrites, UA Positive (*)     Urobilinogen, UA Negative      Leukocyte Esterase, UA 2+ (*)    CBC WITH DIFFERENTIAL - Abnormal    WBC 20.00 (*)     RBC 3.69 (*)     HGB 6.4 (*)     HCT 22.2 (*)     MCV 60 (*)     MCH 17.3 (*)      MCHC 28.8 (*)     RDW 20.4 (*)     Platelet Count 416      MPV 10.9      Nucleated RBC 0      Neut % 89.8 (*)     Lymph % 5.0 (*)     Mono % 2.4 (*)     Eos % 0.2      Basophil % 0.2      Imm Grans % 2.4 (*)     Neut # 17.96 (*)     Lymph # 1.00      Mono # 0.48      Eos # 0.04      Baso # 0.04      Imm Grans # 0.48 (*)     Narrative:     This is an appended report.  These results have been appended to a previously verified report.   URINALYSIS MICROSCOPIC - Abnormal    RBC, UA 1      WBC, UA 12 (*)     Bacteria, UA Moderate (*)     Microscopic Comment       B-TYPE NATRIURETIC PEPTIDE - Normal    BNP 35     CULTURE, BLOOD   CULTURE, BLOOD   CULTURE, URINE   CBC W/ AUTO DIFFERENTIAL    Narrative:     The following orders were created for panel order CBC auto differential.  Procedure                               Abnormality         Status                     ---------                               -----------         ------                     CBC with Differential[[id number]]       Abnormal            Final result                 Please view results for these tests on the individual orders.   HEPATITIS C ANTIBODY   HEP C VIRUS HOLD SPECIMEN   HIV 1 / 2 ANTIBODY   TROPONIN I   LACTIC ACID, PLASMA   LACTIC ACID, PLASMA   CORTISOL, RANDOM   GREY TOP URINE HOLD   PROCALCITONIN   DRUG SCREEN PANEL, URINE EMERGENCY     EKG Readings: (Independently Interpreted)   Rhythm: Normal Sinus Rhythm. Heart Rate: 89. ST Segments: Normal ST Segments. T Waves: Normal. Axis: Normal. Clinical Impression: Normal Sinus Rhythm     ECG Results              EKG 12-lead (In process)        Collection Time Result Time QRS Duration [facility] QTC Calculation    07/20/25 07:01:18 07/20/25 08:50:22 80 423                     In process by Interface, Lab In [facility] (07/20/25 08:50:30)                   Narrative:    Test Reason : Z13.6,    Vent. Rate :  89 BPM     Atrial Rate :  89 BPM     P-R Int : 122 ms          QRS Dur :  80 ms      QT Int : 348 ms        P-R-T Axes :  58  38  27 degrees    QTcB Int : 423 ms    Normal sinus rhythm  Low voltage QRS  Cannot rule out Anterior infarct ,age undetermined  Abnormal ECG  No previous ECGs available    Referred By: AAAREFERRAL SELF           Confirmed By:                                   Imaging Results              X-Ray Chest AP Portable (Final result)  Result time 07/20/25 07:22:56      Final result by Jorje Ahmadi MD (07/20/25 07:22:56)                   Impression:     As above.    Finalized on: 7/20/2025 7:22 AM By:  Jorje Ahmadi MD  [facility] [id number]      2025-07-20 07:25:06.149     [facility] [place]arrative:    EXAM: XR CHEST AP PORTABLE    CLINICAL HISTORY: Sepsis.    FINDINGS:  Heart upper limit of normal size.  Lungs are clear.  No pleural fluid or pneumothorax.                                    Discussed case with HM, rec tx to ED for blood transfusion and final disposition between floor and ICU. Pt reports daily Prednisone 10 mg- will give stress dose of Steroid    BP remains borderline, mid 90s systolic. Concern for possible need for pressor support during transfer to . Central Line placed. Currently normotensive, 107-116 sys will hold levophed. Updated receiving Hospital.    9:03 AM Discussed lab/imaging studies with patient and the need for further evaluation/admission for Anemia, FERMIN, Hypotension, . Pt verbalized understanding that this is a stand alone ER and we are unable to admit at this facility. Pt will be transferred to [facility] ED via [facility] with care en route to include CM. I discussed this case with HS and care was accepted by Dr Chua    9:05 AM [facility]  6 Hours Sepsis Perfusion Exam   Provider Attestation    I attest, a sepsis perfusion exam was performed at 09:03 AM within 6 hours of Septic Shock presentation, following fluid resuscitation.        Medications   naloxone 0.4 mg/mL injection 1 mg ( Intravenous Canceled Entry 7/20/25 0700)   sodium chloride  0.9% bolus 2,145 mL 2,145 mL (0 mLs Intravenous Stopped 7/20/25 0746)   ceFEPIme injection 2 g (2 g Intravenous Given 7/20/25 0700)   naloxone (NARCAN) 1 mg/mL injection (1 mg  Given 7/20/25 0657)   sodium chloride 0.9% bolus 1,000 mL 1,000 mL (0 mLs Intravenous Stopped 7/20/25 0902)   dexAMETHasone injection 6 mg (6 mg Intravenous Given 7/20/25 0756)   0.9% NaCl infusion (1,000 mLs Intravenous New Bag 7/20/25 0903)     Medical Decision Making  DDx Sepsis, FERMIN, Dehydration, Narcosis    Problems Addressed:  FERMIN (acute kidney injury): acute illness or injury  Anemia, unspecified type: chronic illness or injury with exacerbation, progression, or side effects of treatment  Hypotension, unspecified hypotension type: undiagnosed new problem with uncertain prognosis    Amount and/or Complexity of Data Reviewed  Labs: ordered.  Radiology: ordered.  ECG/medicine tests: ordered and independent interpretation performed. Decision-making details documented in ED Course.    Risk  Prescription drug management.  Decision regarding hospitalization.                                          Clinical Impression:  Final diagnoses:  [Z13.6] Screening for cardiovascular condition  [N17.9] FERMIN (acute kidney injury) (Primary)  [D64.9] Anemia, unspecified type  [I95.9] Hypotension, unspecified hypotension type          ED Disposition Condition    ED to ED Transfer Serious                    Antwon Coates MD  07/20/25 0908       Antwon Coates MD  07/20/25 0908         [1]   Social History  Tobacco Use    Smoking status: Every Day     Current packs/day: 0.50     Types: Cigarettes    Smokeless tobacco: Never   Substance Use Topics    Alcohol use: No    Drug use: No        Antwon Coates MD  07/20/25 1005

## 2025-07-20 NOTE — ED PROVIDER NOTES
SCRIBE #1 NOTE: I, Brigido Kirk, am scribing for, and in the presence of, Leonela Tiwari MD. I have scribed the entire note.       History     Chief Complaint   Patient presents with    Back Pain     Reports back pain from previous injury. Lower back pain. Appears drowsy and weak.      Review of patient's allergies indicates:   Allergen Reactions    Codeine          History of Present Illness     HPI    7/20/2025, 10:42 AM  History obtained from the patient and medical records      History of Present Illness: Delicia Moe is a 49 y.o. female patient with a PMHx of HTN and spinal stenosis who presents to the Emergency Department for concerns of septic UTI. Pt was sent to the ED from [facility] for ICU admission. The pt initially came for lower back pain. The pain is chronic and has been evaluated by Dr. Montaño (Spine Specialist) who believes the available surgical procedure may not provide pain relief but would stabilize the spine. Symptoms are constant and moderate in severity. No mitigating or exacerbating factors reported. Associated sxs include generalized weakness and fatigue. Patient denies any CP, SOB, fever, SOB, and all other sxs at this time. No further complaints or concerns at this time.     Pt's  acted as independent historian. Reports concerns that the pt may be becoming dependent on opiates for her back pain. Pt called the  this morning at 5 AM from work. The  states that the pt was lethargic after taking 3 of her Narco doses. Pt required Narcan this morning. Pt's pain management doctor is Dr. Kim. The  is requesting for help and possible placing the pt in a nursing home. Reports he is the only one caring for the pt, which is becoming more and more of a time commitment. Reports that pt's daughters do not assist with her care, and the pt does not manage herself appropriately. Pt has been getting recurrent UTIs due to persistently wearing soiled  clothing.    Arrival mode: Ambulance Service    PCP: Baldev Venegas MD        Past Medical History:  Past Medical History:   Diagnosis Date    Chronic back pain     Hypertension     Migraines     Obese     Spinal stenosis        Past Surgical History:  Past Surgical History:   Procedure Laterality Date    COLPOSCOPY           Family History:  Family History   Problem Relation Name Age of Onset    Dementia Mother      Frontotemporal dementia Mother      Lupus Mother      Vasculitis Mother      Vasculitis Father      Diabetes Father      Lupus Father         Social History:  Social History     Tobacco Use    Smoking status: Every Day     Current packs/day: 0.50     Types: Cigarettes    Smokeless tobacco: Never   Substance and Sexual Activity    Alcohol use: No    Drug use: No    Sexual activity: Not on file        Review of Systems     Review of Systems   Constitutional:  Positive for fatigue. Negative for chills and fever.   HENT:  Negative for congestion and sore throat.    Respiratory:  Negative for cough and shortness of breath.    Cardiovascular:  Negative for chest pain.   Gastrointestinal:  Negative for abdominal pain, nausea and vomiting.   Genitourinary:  Negative for dysuria.   Musculoskeletal:  Positive for back pain (lower).   Skin:  Negative for rash.   Neurological:  Positive for weakness (generalized). Negative for dizziness, seizures, light-headedness, numbness and headaches.   Hematological:  Does not bruise/bleed easily.   All other systems reviewed and are negative.       Physical Exam     Initial Vitals [07/20/25 0641]   BP Pulse Resp Temp SpO2   (!) 83/44 99 20 98.5 °F (36.9 °C) 97 %      MAP       --          Physical Exam  Nursing Notes and Vital Signs Reviewed.  Constitutional: Patient is in no acute distress. Ill-appearing.  Head: Atraumatic. Normocephalic.  Eyes: PERRL. EOM intact. Conjunctivae are not pale. No scleral icterus.  ENT: Mucous membranes are moist. Oropharynx is clear and  symmetric.    Neck: Supple. Full ROM. No lymphadenopathy.  Cardiovascular: Tachycardic. Hypotensive. No murmurs, rubs, or gallops. Distal pulses are 2+ and symmetric. Femoral central line to right side.  Pulmonary/Chest: On 2 L oxygen NC.  Abdominal: Soft and non-distended.  There is no tenderness.  No rebound, guarding, or rigidity. Good bowel sounds.  Genitourinary: No CVA tenderness.  Musculoskeletal: Moves all extremities. No obvious deformities. No edema. No calf tenderness. Lumbar Paraspinous muscle tenderness without bony deformity.  Skin: Warm and dry.  Neurological:  Alert, awake, and appropriate.  Normal speech.  No acute focal neurological deficits are appreciated.  Psychiatric: Normal affect. Good eye contact. Appropriate in content.       ED Course   Critical Care    Date/Time: 7/20/2025 10:54 AM    Performed by: Leonela Tiwari MD  Authorized by: Leonela Tiwari MD  Direct patient critical care time: 25 minutes  Additional history critical care time: 15 minutes  Ordering / reviewing critical care time: 5 minutes  Documentation critical care time: 5 minutes  Consulting other physicians critical care time: 10 minutes  Total critical care time (exclusive of procedural time) : 60 minutes  Critical care time was exclusive of separately billable procedures and treating other patients and teaching time.  Critical care was necessary to treat or prevent imminent or life-threatening deterioration of the following conditions: sepsis (FERMIN).  Critical care was time spent personally by me on the following activities: blood draw for specimens, development of treatment plan with patient or surrogate, discussions with consultants, evaluation of patient's response to treatment, interpretation of cardiac output measurements, examination of patient, ordering and performing treatments and interventions, obtaining history from patient or surrogate, ordering and review of radiographic studies, ordering and review of  laboratory studies, re-evaluation of patient's condition, review of old charts and pulse oximetry.        ED Vital Signs:  Vitals:    07/20/25 0702 07/20/25 0707 07/20/25 0708 07/20/25 0719   BP: (!) 85/45 (!) 83/62 (!) 75/38 (!) 94/53   Pulse: 88 95 91 104   Resp: (!) 25 20 (!) 22 (!) 24   Temp:       TempSrc:       SpO2: 99% 98% 98%    Weight:       Height:        07/20/25 0732 07/20/25 0746 07/20/25 0801 07/20/25 0807   BP:  124/87 (!) 93/51 (!) 92/52   Pulse: 101 102 104 105   Resp: (!) 24   (!) 24   Temp:       TempSrc:       SpO2:  (!) 91% (!) 90% (!) 90%   Weight:       Height:        07/20/25 0809 07/20/25 0843 07/20/25 0848 07/20/25 0858   BP:  (!) 114/59 (!) 116/55 (!) 107/47   Pulse: 104 101 100 103   Resp: (!) 24   20   Temp:       TempSrc:       SpO2: (!) 91% 99% 99% (!) 91%   Weight:       Height:        07/20/25 0902 07/20/25 0908 07/20/25 1030   BP: (!) 104/50 (!) 100/53 (!) 111/57   Pulse: 101 101 95   Resp: 19  18   Temp:      TempSrc:      SpO2: 95% (!) 91% 100%   Weight:      Height:          Abnormal Lab Results:  Labs Reviewed   COMPREHENSIVE METABOLIC PANEL - Abnormal       Result Value    Sodium 130 (*)     Potassium 4.0      Chloride 95      CO2 19 (*)     Glucose 148 (*)     BUN 33 (*)     Creatinine 3.4 (*)     Calcium 10.1      Protein Total 7.0      Albumin 2.7 (*)     Bilirubin Total 0.8            AST 36      ALT 35      Anion Gap 16      eGFR 16 (*)    LACTIC ACID, PLASMA - Abnormal    Lactic Acid Level 2.7 (*)     Narrative:     Falsely low lactic acid results can be found in samples containing >=13.0 mg/dL total bilirubin and/or >=3.5 mg/dL direct bilirubin.    URINALYSIS, REFLEX TO URINE CULTURE - Abnormal    Color, UA Yellow      Appearance, UA Clear      pH, UA 6.0      Spec Grav UA 1.010      Protein, UA Trace (*)     Glucose, UA Negative      Ketones, UA Negative      Bilirubin, UA 3+ (*)     Blood, UA Negative      Nitrites, UA Positive (*)     Urobilinogen, UA Negative       Leukocyte Esterase, UA 2+ (*)    CBC WITH DIFFERENTIAL - Abnormal    WBC 20.00 (*)     RBC 3.69 (*)     HGB 6.4 (*)     HCT 22.2 (*)     MCV 60 (*)     MCH 17.3 (*)     MCHC 28.8 (*)     RDW 20.4 (*)     Platelet Count 416      MPV 10.9      Nucleated RBC 0      Neut % 89.8 (*)     Lymph % 5.0 (*)     Mono % 2.4 (*)     Eos % 0.2      Basophil % 0.2      Imm Grans % 2.4 (*)     Neut # 17.96 (*)     Lymph # 1.00      Mono # 0.48      Eos # 0.04      Baso # 0.04      Imm Grans # 0.48 (*)     Narrative:     This is an appended report.  These results have been appended to a previously verified report.   URINALYSIS MICROSCOPIC - Abnormal    RBC, UA 1      WBC, UA 12 (*)     Bacteria, UA Moderate (*)     Microscopic Comment       PROCALCITONIN - Abnormal    Procalcitonin 22.96 (*)    ISTAT PROCEDURE - Abnormal    POC PH 7.288 (*)     POC PCO2 41.0      POC PO2 32 (*)     POC HCO3 19.6 (*)     POC BE -7 (*)     POC SATURATED O2 55      Sample VENOUS      Site RR      Allens Test Pass      DelSys Room Air      Mode SPONT     B-TYPE NATRIURETIC PEPTIDE - Normal    BNP 35     LACTIC ACID, PLASMA - Normal    Lactic Acid Level 1.1      Narrative:     Falsely low lactic acid results can be found in samples containing >=13.0 mg/dL total bilirubin and/or >=3.5 mg/dL direct bilirubin.    LACTIC ACID, PLASMA - Normal    Lactic Acid Level 1.7      Narrative:     Falsely low lactic acid results can be found in samples containing >=13.0 mg/dL total bilirubin and/or >=3.5 mg/dL direct bilirubin.    CULTURE, BLOOD   CULTURE, BLOOD   CULTURE, URINE   CLOSTRIDIOIDES DIFFICILE   CBC W/ AUTO DIFFERENTIAL    Narrative:     The following orders were created for panel order CBC auto differential.  Procedure                               Abnormality         Status                     ---------                               -----------         ------                     CBC with Differential[[id number]]       Abnormal            Final result                  Please view results for these tests on the individual orders.   HEPATITIS C ANTIBODY   HEP C VIRUS HOLD SPECIMEN   HIV 1 / 2 ANTIBODY   TROPONIN I   CORTISOL, RANDOM   GREY TOP URINE HOLD   DRUG SCREEN PANEL, URINE EMERGENCY   OCCULT BLOOD X 1, STOOL   EXTRA TUBES    Narrative:     The following orders were created for panel order EXTRA TUBES.  Procedure                               Abnormality         Status                     ---------                               -----------         ------                     Light Green Top Hold[[id number]]                            In process                 Lavender Top Hold[[id number]]                               In process                 Gold Top Hold[[id number]]                                   In process                 Pink Top Hold[[id number]]                                                                Please view results for these tests on the individual orders.   LIGHT GREEN TOP HOLD   LAVENDER TOP HOLD   GOLD TOP HOLD   TYPE & SCREEN        All Lab Results:  Results for orders placed or performed during the hospital encounter of 07/20/25   Comprehensive metabolic panel    Collection Time: 07/20/25  6:55 AM   Result Value Ref Range    Sodium 130 (L) 136 - 145 mmol/L    Potassium 4.0 3.5 - 5.1 mmol/L    Chloride 95 95 - 110 mmol/L    CO2 19 (L) 23 - 29 mmol/L    Glucose 148 (H) 70 - 110 mg/dL    BUN 33 (H) 6 - 20 mg/dL    Creatinine 3.4 (H) 0.5 - 1.4 mg/dL    Calcium 10.1 8.7 - 10.5 mg/dL    Protein Total 7.0 6.0 - 8.4 gm/dL    Albumin 2.7 (L) 3.5 - 5.2 g/dL    Bilirubin Total 0.8 0.1 - 1.0 mg/dL     40 - 150 unit/L    AST 36 11 - 45 unit/L    ALT 35 10 - 44 unit/L    Anion Gap 16 8 - 16 mmol/L    eGFR 16 (L) >60 mL/min/1.73/m2   Lactic acid, plasma #1    Collection Time: 07/20/25  6:55 AM   Result Value Ref Range    Lactic Acid Level 2.7 (H) 0.5 - 2.2 mmol/L   CBC with Differential    Collection Time: 07/20/25  6:55 AM   Result Value  Ref Range    WBC 20.00 (H) 3.90 - 12.70 K/uL    RBC 3.69 (L) 4.00 - 5.40 M/uL    HGB 6.4 (L) 12.0 - 16.0 gm/dL    HCT 22.2 (L) 37.0 - 48.5 %    MCV 60 (L) 82 - 98 fL    MCH 17.3 (L) 27.0 - 31.0 pg    MCHC 28.8 (L) 32.0 - 36.0 g/dL    RDW 20.4 (H) 11.5 - 14.5 %    Platelet Count 416 150 - 450 K/uL    MPV 10.9 9.2 - 12.9 fL    Nucleated RBC 0 <=0 /100 WBC    Neut % 89.8 (H) 38 - 73 %    Lymph % 5.0 (L) 18 - 48 %    Mono % 2.4 (L) 4 - 15 %    Eos % 0.2 <=8 %    Basophil % 0.2 <=1.9 %    Imm Grans % 2.4 (H) 0.0 - 0.5 %    Neut # 17.96 (H) 1.8 - 7.7 K/uL    Lymph # 1.00 1 - 4.8 K/uL    Mono # 0.48 0.3 - 1 K/uL    Eos # 0.04 <=0.5 K/uL    Baso # 0.04 <=0.2 K/uL    Imm Grans # 0.48 (H) 0.00 - 0.04 K/uL   EKG 12-lead    Collection Time: 07/20/25  7:01 AM   Result Value Ref Range    QRS Duration 80 ms    [facility] QTC Calculation 423 ms   Brain natriuretic peptide    Collection Time: 07/20/25  7:21 AM   Result Value Ref Range    BNP 35 0 - 99 pg/mL   Urinalysis, Reflex to Urine Culture Urine, Clean Catch    Collection Time: 07/20/25  7:41 AM    Specimen: Urine, Clean Catch   Result Value Ref Range    Color, UA Yellow Straw, Trisha, Yellow, Light-Orange    Appearance, UA Clear Clear    pH, UA 6.0 5.0 - 8.0    Spec Grav UA 1.010 1.005 - 1.030    Protein, UA Trace (A) Negative    Glucose, UA Negative Negative    Ketones, UA Negative Negative    Bilirubin, UA 3+ (A) Negative    Blood, UA Negative Negative    Nitrites, UA Positive (A) Negative    Urobilinogen, UA Negative <2.0 EU/dL    Leukocyte Esterase, UA 2+ (A) Negative   Urinalysis Microscopic    Collection Time: 07/20/25  7:41 AM   Result Value Ref Range    RBC, UA 1 0 - 4 /HPF    WBC, UA 12 (H) 0 - 5 /HPF    Bacteria, UA Moderate (A) None, Rare, Occasional /HPF    Microscopic Comment     ISTAT PROCEDURE    Collection Time: 07/20/25  8:22 AM   Result Value Ref Range    POC PH 7.288 (LL) 7.35 - 7.45    POC PCO2 41.0 35 - 45 mmHg    POC PO2 32 (L) 40 - 60 mmHg    POC HCO3 19.6 (L)  24 - 28 mmol/L    POC BE -7 (L) -2 to 2 mmol/L    POC SATURATED O2 55 95 - 100 %    Sample VENOUS     Site RR     Allens Test Pass     DelSys Room Air     Mode SPONT    Lactic acid, plasma #2    Collection Time: 07/20/25  9:02 AM   Result Value Ref Range    Lactic Acid Level 1.1 0.5 - 2.2 mmol/L   Procalcitonin    Collection Time: 07/20/25  9:02 AM   Result Value Ref Range    Procalcitonin 22.96 (H) <0.25 ng/mL   Lactic Acid, Plasma    Collection Time: 07/20/25 10:07 AM   Result Value Ref Range    Lactic Acid Level 1.7 0.5 - 2.2 mmol/L       Imaging Results:  Imaging Results              X-Ray Chest AP Portable (Final result)  Result time 07/20/25 07:22:56      Final result by Jorje Ahmadi MD (07/20/25 07:22:56)                   Impression:     As above.    Finalized on: 7/20/2025 7:22 AM By:  Jorje Ahmadi MD  [facility]# [id number]      2025-07-20 07:25:06.149     [facility]               Narrative:    EXAM: XR CHEST AP PORTABLE    CLINICAL HISTORY: Sepsis.    FINDINGS:  Heart upper limit of normal size.  Lungs are clear.  No pleural fluid or pneumothorax.                                         The EKG was ordered, reviewed, and independently interpreted by the ED provider.  Interpretation time: 07:01  Rate: 89 BPM  Rhythm: normal sinus rhythm  Interpretation: Low voltage QRS. No STEMI.              The Emergency Provider reviewed the vital signs and test results, which are outlined above.     ED Discussion         10:46 AM: Discussed case with Dr. Fong  (ICU). Dr. Fong   agrees with current care and management of pt and accepts admission.   Admitting Service: ICU  Admitting Physician: Dr. Fong    Admit to: ICU    10:46 AM: Re-evaluated pt.  I have discussed test results, shared treatment plan, and the need for admission with patient/family/caretaker at bedside. Pt and/or family/caretaker express understanding at this time and agree with all information. All questions answered. Pt/caretaker/family  member(s) have no further questions or concerns at this time. Pt is ready for admit.              Medical Decision Making   IVFs/cefipime have been given for sepsis. I ordered type & screen for transfusion of 2 u PRBCs. I think she would benefit from ICU admission,   is concerned about her opiate abuse and feels that he can't supervise her medications or take care of her when he is working at nights (truckdriver) and is requesting nursing home placement on discharge. She cannot safely perform ADLs. Will place  consult. Will admit to ICU    Amount and/or Complexity of Data Reviewed  Independent Historian: spouse     Details:  at bedside  External Data Reviewed: labs, radiology, ECG and notes.  Labs: ordered. Decision-making details documented in ED Course.  Radiology: ordered and independent interpretation performed. Decision-making details documented in ED Course.  ECG/medicine tests: ordered and independent interpretation performed. Decision-making details documented in ED Course.    Risk  OTC drugs.  Prescription drug management.  Decision regarding hospitalization.    Critical Care  Total time providing critical care: 55 minutes                ED Medication(s):  Medications   nicotine 14 mg/24 hr 1 patch (has no administration in time range)   sucralfate 100 mg/mL suspension 1 g (has no administration in time range)   aluminum-magnesium hydroxide-simethicone 200-200-20 mg/5 mL suspension 30 mL (has no administration in time range)   famotidine tablet 20 mg (has no administration in time range)   meropenem injection 500 mg (has no administration in time range)   hydrocortisone sodium succinate injection 100 mg (has no administration in time range)   mupirocin 2 % ointment (has no administration in time range)   naloxone 0.4 mg/mL injection 1 mg ( Intravenous Canceled Entry 7/20/25 0700)   sodium chloride 0.9% bolus 2,145 mL 2,145 mL (0 mLs Intravenous Stopped 7/20/25 0746)   ceFEPIme  injection 2 g (2 g Intravenous Given 7/20/25 0700)   naloxone (NARCAN) 1 mg/mL injection (1 mg  Given 7/20/25 0657)   sodium chloride 0.9% bolus 1,000 mL 1,000 mL (0 mLs Intravenous Stopped 7/20/25 0902)   dexAMETHasone injection 6 mg (6 mg Intravenous Given 7/20/25 0756)   0.9% NaCl infusion (1,000 mLs Intravenous New Bag 7/20/25 0903)       New Prescriptions    No medications on file               Scribe Attestation:   Scribe #1: I performed the above scribed service and the documentation accurately describes the services I performed. I attest to the accuracy of the note.     Attending:   Physician Attestation Statement for Scribe #1: I, Leonela Tiwari MD, personally performed the services described in this documentation, as scribed by Brigido Kirk, in my presence, and it is both accurate and complete.           Clinical Impression       ICD-10-CM ICD-9-CM   1. FERMIN (acute kidney injury)  N17.9 584.9   2. Screening for cardiovascular condition  Z13.6 V81.2   3. Anemia, unspecified type  D64.9 285.9   4. Hypotension, unspecified hypotension type  I95.9 458.9   5. Sepsis, due to unspecified organism, unspecified whether acute organ dysfunction present  A41.9 038.9     995.91   6. Acute cystitis without hematuria  N30.00 595.0       Disposition:   Disposition: Admitted  Condition: Serious         Leonela Tiwari MD  07/23/25 1023

## 2025-07-20 NOTE — PLAN OF CARE
Pt admitted to ICU, monitors applied and alarms activated. 1uPRBCs given per MD orders. Pt A&OX4, on RA, SR on monitor, able to move all extremities freely and turns independently. Muro catheter in place with adequate UOP. POC reviewed with pt and spouse at bedside. Call light within reach. Fall and safety precautions in place.

## 2025-07-20 NOTE — SUBJECTIVE & OBJECTIVE
Past Medical History:   Diagnosis Date    Chronic back pain     Hypertension     Migraines     Obese     Spinal stenosis        Past Surgical History:   Procedure Laterality Date    COLPOSCOPY         Review of patient's allergies indicates:   Allergen Reactions    Codeine        Family History       Problem Relation (Age of Onset)    Dementia Mother    Diabetes Father    Frontotemporal dementia Mother    Lupus Mother, Father    Vasculitis Mother, Father          Tobacco Use    Smoking status: Every Day     Current packs/day: 0.50     Types: Cigarettes    Smokeless tobacco: Never   Substance and Sexual Activity    Alcohol use: No    Drug use: No    Sexual activity: Not on file         Review of Systems   All other systems reviewed and are negative.    Objective:     Vital Signs (Most Recent):  Temp: 98.5 °F (36.9 °C) (07/20/25 0641)  Pulse: 95 (07/20/25 1030)  Resp: 18 (07/20/25 1030)  BP: (!) 111/57 (07/20/25 1030)  SpO2: 100 % (07/20/25 1030) Vital Signs (24h Range):  Temp:  [98.5 °F (36.9 °C)] 98.5 °F (36.9 °C)  Pulse:  [] 95  Resp:  [18-25] 18  SpO2:  [90 %-100 %] 100 %  BP: ()/(38-87) 111/57     Weight: 96.6 kg (213 lb)  Body mass index is 36.56 kg/m².      Intake/Output Summary (Last 24 hours) at 7/20/2025 1135  Last data filed at 7/20/2025 0902  Gross per 24 hour   Intake 3145 ml   Output --   Net 3145 ml        Physical Exam  Vitals and nursing note reviewed.   Constitutional:       General: She is not in acute distress.     Appearance: She is ill-appearing. She is not toxic-appearing or diaphoretic.   HENT:      Head: Normocephalic.   Eyes:      Pupils: Pupils are equal, round, and reactive to light.   Cardiovascular:      Rate and Rhythm: Normal rate.      Pulses: Normal pulses.   Pulmonary:      Effort: Pulmonary effort is normal.   Abdominal:      Palpations: Abdomen is soft.   Skin:     Capillary Refill: Capillary refill takes less than 2 seconds.   Neurological:      General: No focal  deficit present.      Mental Status: She is alert.          Vents:       Lines/Drains/Airways       Central Venous Catheter Line  Duration                  PowerPort A Cath 07/20/25 0846 Femoral Vein Right;Femoral Artery Right <1 day              Drain  Duration                  Urethral Catheter 07/20/25 0745 Non-latex;Double-lumen 16 Fr. <1 day              Peripheral Intravenous Line  Duration             Peripheral IV 07/20/25 0657 20 G Anterior;Left Upper Arm <1 day    Peripheral IV 07/20/25 0706 20 G Anterior;Right Upper Arm <1 day                    Significant Labs:    CBC/Anemia Profile:  Recent Labs   Lab 07/20/25  0655   WBC 20.00*   HGB 6.4*   HCT 22.2*      MCV 60*   RDW 20.4*        Chemistries:  Recent Labs   Lab 07/20/25  0655   *   K 4.0   CL 95   CO2 19*   BUN 33*   CREATININE 3.4*   CALCIUM 10.1   ALBUMIN 2.7*   PROT 7.0   BILITOT 0.8   ALKPHOS 107   ALT 35   AST 36       All pertinent labs within the past 24 hours have been reviewed.    Significant Imaging:   I have reviewed all pertinent imaging results/findings within the past 24 hours.

## 2025-07-20 NOTE — PROGRESS NOTES
Pharmacist Renal Dose Adjustment Note    Delicia Moe is a 49 y.o. female being treated with the medication Merrem    Patient Data:    Vital Signs (Most Recent):  Temp: 98.5 °F (36.9 °C) (07/20/25 0641)  Pulse: 101 (07/20/25 0908)  Resp: 19 (07/20/25 0902)  BP: (!) 100/53 (07/20/25 0908)  SpO2: (!) 91 % (07/20/25 0908) Vital Signs (72h Range):  Temp:  [98.5 °F (36.9 °C)]   Pulse:  []   Resp:  [19-25]   BP: ()/(38-87)   SpO2:  [90 %-99 %]      Recent Labs   Lab 07/20/25 0655   CREATININE 3.4*     Serum creatinine: 3.4 mg/dL (H) 07/20/25 0655  Estimated creatinine clearance: 22.8 mL/min (A)    Medication:merrem dose: 1g frequency q12 will be changed to medication:merrem dose:500mg frequency:q12    Pharmacist's Name: Christie Meadows  Pharmacist's Extension: [phone]

## 2025-07-20 NOTE — ASSESSMENT & PLAN NOTE
Anemia is likely due to chronic blood loss. Most recent hemoglobin and hematocrit are listed below.  Recent Labs     07/20/25  0655   HGB 6.4*   HCT 22.2*     Plan  - Monitor serial CBC: Daily  - Transfuse PRBC if patient becomes hemodynamically unstable, symptomatic or H/H drops below 7/21.  - Patient has received 2 units of PRBCs on 7/20  - Patient's anemia is currently stable  - Sec to chronic heavy uterine bleeding per patient and

## 2025-07-20 NOTE — HPI
49 y.o. female patient with a PMHx of HTN and spinal stenosis who presents to the Emergency Department for concerns of septic UTI. Pt was sent to the ED from [facility] for ICU admission. The pt initially came for lower back pain. The pain is chronic and has been evaluated by Dr. Montaño (Spine Specialist) who believes the available surgical procedure may not provide pain relief but would stabilize the spine. Symptoms are constant and moderate in severity. No mitigating or exacerbating factors reported. Associated sxs include generalized weakness and fatigue. Patient denies any CP, SOB, fever, SOB, and all other sxs at this time. No further complaints or concerns at this time. She has been on an escalating dose of Neurontin for years and also has been on prednisone 20 mg a day recently reduced to 10 mg a day for over 10 years. She also takes other pain meds like norco but adamantly denies NSAIDS. She also a long hx of heavy menses and has had chronic anemia. She had prolong heavy menses a couple weeks ago and there has been talks about need for hysterectomy. She denies abdominal pain, N/V, Blood in stools or any other source of bleeding.

## 2025-07-20 NOTE — ASSESSMENT & PLAN NOTE
FERMIN is likely due to pre-renal azotemia due to intravascular volume depletion. Baseline creatinine is unknown. Most recent creatinine and eGFR are listed below.  Recent Labs     07/20/25  0655   CREATININE 3.4*   EGFRNORACEVR 16*      Plan  - FERMIN is stable  - Avoid nephrotoxins and renally dose meds for GFR listed above  - Monitor urine output, serial BMP, and adjust therapy as needed

## 2025-07-20 NOTE — H&P
[facility] - Intensive Care ([facility])  Critical Care Medicine  History & Physical    Patient Name: Delicia Moe  MRN: [id number]  Admission Date: 7/20/2025  Hospital Length of Stay: 0 days  Code Status: Prior  Attending Physician: No att. providers found   Primary Care Provider: Baldev Venegas MD   Principal Problem: <principal problem not specified>    Subjective:     HPI:  49 y.o. female patient with a PMHx of HTN and spinal stenosis who presents to the Emergency Department for concerns of septic UTI. Pt was sent to the ED from [facility] for ICU admission. The pt initially came for lower back pain. The pain is chronic and has been evaluated by Dr. Montaño (Spine Specialist) who believes the available surgical procedure may not provide pain relief but would stabilize the spine. Symptoms are constant and moderate in severity. No mitigating or exacerbating factors reported. Associated sxs include generalized weakness and fatigue. Patient denies any CP, SOB, fever, SOB, and all other sxs at this time. No further complaints or concerns at this time. She has been on an escalating dose of Neurontin for years and also has been on prednisone 20 mg a day recently reduced to 10 mg a day for over 10 years. She also takes other pain meds like norco but adamantly denies NSAIDS. She also a long hx of heavy menses and has had chronic anemia. She had prolong heavy menses a couple weeks ago and there has been talks about need for hysterectomy. She denies abdominal pain, N/V, Blood in stools or any other source of bleeding.            Hospital/ICU Course:  No notes on file     Past Medical History:   Diagnosis Date    Chronic back pain     Hypertension     Migraines     Obese     Spinal stenosis        Past Surgical History:   Procedure Laterality Date    COLPOSCOPY         Review of patient's allergies indicates:   Allergen Reactions    Codeine        Family History       Problem Relation (Age of Onset)    Dementia Mother     Diabetes Father    Frontotemporal dementia Mother    Lupus Mother, Father    Vasculitis Mother, Father          Tobacco Use    Smoking status: Every Day     Current packs/day: 0.50     Types: Cigarettes    Smokeless tobacco: Never   Substance and Sexual Activity    Alcohol use: No    Drug use: No    Sexual activity: Not on file         Review of Systems   All other systems reviewed and are negative.    Objective:     Vital Signs (Most Recent):  Temp: 98.5 °F (36.9 °C) (07/20/25 0641)  Pulse: 95 (07/20/25 1030)  Resp: 18 (07/20/25 1030)  BP: (!) 111/57 (07/20/25 1030)  SpO2: 100 % (07/20/25 1030) Vital Signs (24h Range):  Temp:  [98.5 °F (36.9 °C)] 98.5 °F (36.9 °C)  Pulse:  [] 95  Resp:  [18-25] 18  SpO2:  [90 %-100 %] 100 %  BP: ()/(38-87) 111/57     Weight: 96.6 kg (213 lb)  Body mass index is 36.56 kg/m².      Intake/Output Summary (Last 24 hours) at 7/20/2025 1135  Last data filed at 7/20/2025 0902  Gross per 24 hour   Intake 3145 ml   Output --   Net 3145 ml        Physical Exam  Vitals and nursing note reviewed.   Constitutional:       General: She is not in acute distress.     Appearance: She is ill-appearing. She is not toxic-appearing or diaphoretic.   HENT:      Head: Normocephalic.   Eyes:      Pupils: Pupils are equal, round, and reactive to light.   Cardiovascular:      Rate and Rhythm: Normal rate.      Pulses: Normal pulses.   Pulmonary:      Effort: Pulmonary effort is normal.   Abdominal:      Palpations: Abdomen is soft.   Skin:     Capillary Refill: Capillary refill takes less than 2 seconds.   Neurological:      General: No focal deficit present.      Mental Status: She is alert.          Vents:       Lines/Drains/Airways       Central Venous Catheter Line  Duration                  PowerPort A Cath 07/20/25 0846 Femoral Vein Right;Femoral Artery Right <1 day              Drain  Duration                  Urethral Catheter 07/20/25 0745 Non-latex;Double-lumen 16 Fr. <1 day               Peripheral Intravenous Line  Duration             Peripheral IV 07/20/25 0657 20 G Anterior;Left Upper Arm <1 day    Peripheral IV 07/20/25 0706 20 G Anterior;Right Upper Arm <1 day                    Significant Labs:    CBC/Anemia Profile:  Recent Labs   Lab 07/20/25  0655   WBC 20.00*   HGB 6.4*   HCT 22.2*      MCV 60*   RDW 20.4*        Chemistries:  Recent Labs   Lab 07/20/25  0655   *   K 4.0   CL 95   CO2 19*   BUN 33*   CREATININE 3.4*   CALCIUM 10.1   ALBUMIN 2.7*   PROT 7.0   BILITOT 0.8   ALKPHOS 107   ALT 35   AST 36       All pertinent labs within the past 24 hours have been reviewed.    Significant Imaging:   I have reviewed all pertinent imaging results/findings within the past 24 hours.  Assessment/Plan:     Neuro  Chronic pain syndrome  Sec to spinal stenosis.  Continue neurontin, norco    Renal/  FERMIN (acute kidney injury)  FERMIN is likely due to pre-renal azotemia due to intravascular volume depletion. Baseline creatinine is unknown. Most recent creatinine and eGFR are listed below.  Recent Labs     07/20/25  0655   CREATININE 3.4*   EGFRNORACEVR 16*      Plan  - FERMIN is stable  - Avoid nephrotoxins and renally dose meds for GFR listed above  - Monitor urine output, serial BMP, and adjust therapy as needed      ID  Acute pyelonephritis  Continue Abx and follow cultures    Septic shock  This patient has shock. The type of shock is distributive due to sepsis. The patient has the following evidence of shock: persistent hypotension, FERMIN, and altered mental status. The patient will be admitted to an intensive care unit, they will be treated with   Careful hydration  PRBC  Abx and serial labs  .    Oncology  Severe anemia  Anemia is likely due to chronic blood loss. Most recent hemoglobin and hematocrit are listed below.  Recent Labs     07/20/25  0655   HGB 6.4*   HCT 22.2*     Plan  - Monitor serial CBC: Daily  - Transfuse PRBC if patient becomes hemodynamically unstable, symptomatic or  H/H drops below 7/21.  - Patient has received 2 units of PRBCs on 7/20  - Patient's anemia is currently stable  - Sec to chronic heavy uterine bleeding per patient and     Endocrine  Adrenal insufficiency  Sec to over a decade of steroid use  Will give stress dose of hydrocortisone       Critical Care Time: 36 minutes  Critical secondary to Patient has a condition that poses threat to life and bodily function: Acute Renal Failure and shock    Critical care was time spent personally by me on the following activities: development of treatment plan with patient or surrogate and bedside caregivers, discussions with consultants, evaluation of patient's response to treatment, examination of patient, ordering and performing treatments and interventions, ordering and review of laboratory studies, ordering and review of radiographic studies, pulse oximetry, re-evaluation of patient's condition. This critical care time did not overlap with that of any other provider or involve time for any procedures.     Tana Fong MD  Critical Care Medicine  [facility] - Intensive [facility] [place])

## 2025-07-20 NOTE — EICU
"Virtual ICU Admission    Admit Date: 2025  LOS: 0  Code Status: Prior   : 1975  Bed: A  09:     Diagnosis: <principal problem not specified>    Patient  has a past medical history of Chronic back pain, Hypertension, Migraines, Obese, and Spinal stenosis.    Last VS: BP (!) 99/53   Pulse 92   Temp 97.6 °F (36.4 °C) (Oral)   Resp (!) 21   Ht 5' 4" (1.626 m)   Wt 96.6 kg (213 lb)   LMP 2025 (Approximate)   SpO2 (!) 94%   Breastfeeding No   BMI 36.56 kg/m²       VICU Review    VICU nurse assessment :  [place] completed and VTE prophylaxis review    Pressure injury prevention panel (order)    Nursing orders placed : IP NICHOLAS Central Line Care and IP NICHOLAS Peripheral IV Access           "

## 2025-07-20 NOTE — ASSESSMENT & PLAN NOTE
This patient has shock. The type of shock is distributive due to sepsis. The patient has the following evidence of shock: persistent hypotension, FERMIN, and altered mental status. The patient will be admitted to an intensive care unit, they will be treated with   Careful hydration  PRBC  Abx and serial labs  .

## 2025-07-20 NOTE — PROGRESS NOTES
Pharmacist Renal Dose Adjustment Note    Delicia Moe is a 49 y.o. female being treated with the medication Pepcid    Patient Data:    Vital Signs (Most Recent):  Temp: 98.5 °F (36.9 °C) (07/20/25 0641)  Pulse: 101 (07/20/25 0908)  Resp: 19 (07/20/25 0902)  BP: (!) 100/53 (07/20/25 0908)  SpO2: (!) 91 % (07/20/25 0908) Vital Signs (72h Range):  Temp:  [98.5 °F (36.9 °C)]   Pulse:  []   Resp:  [19-25]   BP: ()/(38-87)   SpO2:  [90 %-99 %]      Recent Labs   Lab 07/20/25 0655   CREATININE 3.4*     Serum creatinine: 3.4 mg/dL (H) 07/20/25 0655  Estimated creatinine clearance: 22.8 mL/min (A)    Medication:Pepcid dose: 20mg frequency BID will be changed to medication:pepcid dose:20mg frequency:QD    Pharmacist's Name: Christie Meadows  Pharmacist's Extension: [phone]

## 2025-07-21 LAB
ABSOLUTE EOSINOPHIL (OHS): 0 K/UL
ABSOLUTE MONOCYTE (OHS): 0.76 K/UL (ref 0.3–1)
ABSOLUTE NEUTROPHIL COUNT (OHS): 13.83 K/UL (ref 1.8–7.7)
ACB COMPLEX DNA BLD POS QL NAA+NON-PROBE: NOT DETECTED
ALBUMIN SERPL BCP-MCNC: 2.2 G/DL (ref 3.5–5.2)
ALP SERPL-CCNC: 107 UNIT/L (ref 40–150)
ALT SERPL W/O P-5'-P-CCNC: 28 UNIT/L (ref 10–44)
ANION GAP (OHS): 8 MMOL/L (ref 8–16)
AST SERPL-CCNC: 20 UNIT/L (ref 11–45)
B FRAGILIS DNA BLD POS QL NAA+PROBE: NOT DETECTED
BASOPHILS # BLD AUTO: 0.02 K/UL
BASOPHILS NFR BLD AUTO: 0.1 %
BILIRUB SERPL-MCNC: 0.5 MG/DL (ref 0.1–1)
BUN SERPL-MCNC: 29 MG/DL (ref 6–20)
C ALBICANS DNA BLD POS QL NAA+PROBE: NOT DETECTED
C AURIS DNA BLD POS QL NAA+NON-PROBE: NOT DETECTED
C GATTII+NEOFOR DNA CSF QL NAA+NON-PROBE: NOT DETECTED
C GLABRATA DNA BLD POS QL NAA+PROBE: NOT DETECTED
C KRUSEI DNA BLD POS QL NAA+PROBE: NOT DETECTED
C PARAP DNA BLD POS QL NAA+PROBE: NOT DETECTED
C TROPICLS DNA BLD POS QL NAA+PROBE: NOT DETECTED
CALCIUM SERPL-MCNC: 9.3 MG/DL (ref 8.7–10.5)
CHLORIDE SERPL-SCNC: 107 MMOL/L (ref 95–110)
CO2 SERPL-SCNC: 21 MMOL/L (ref 23–29)
COLISTIN RES MCR-1 ISLT/SPM QL: NOT DETECTED
CREAT SERPL-MCNC: 1.7 MG/DL (ref 0.5–1.4)
E CLOAC COMP DNA BLD POS QL NAA+PROBE: NOT DETECTED
E COLI DNA BLD POS QL NAA+PROBE: DETECTED
E FAECALIS+OTHR E SP RRNA BLD POS FISH: NOT DETECTED
E FAECIUM HSP60 BLD POS QL PROBE: NOT DETECTED
ENTEROBACTERALES DNA BLD POS NAA+N-PRB: ABNORMAL
ERYTHROCYTE [DISTWIDTH] IN BLOOD BY AUTOMATED COUNT: 24.9 % (ref 11.5–14.5)
ESBL CFT TO CFT-CLAV IC RTO BD POS IMP: NOT DETECTED
GFR SERPLBLD CREATININE-BSD FMLA CKD-EPI: 37 ML/MIN/1.73/M2
GLUCOSE SERPL-MCNC: 145 MG/DL (ref 70–110)
GP B STREP DNA CSF QL NAA+NON-PROBE: NOT DETECTED
HAEM INFLU DNA CSF QL NAA+NON-PROBE: NOT DETECTED
HCT VFR BLD AUTO: 25.9 % (ref 37–48.5)
HGB BLD-MCNC: 8.1 GM/DL (ref 12–16)
HOLD SPECIMEN: NORMAL
IMM GRANULOCYTES # BLD AUTO: 0.17 K/UL (ref 0–0.04)
IMM GRANULOCYTES NFR BLD AUTO: 1.1 % (ref 0–0.5)
IMP CARBAPENEMASE ISLT QL IA.RAPID: NOT DETECTED
K OXYTOCA OMPA BLD POS QL PROBE: NOT DETECTED
KLEBSIELLA SP DNA BLD POS QL NAA+NON-PRB: NOT DETECTED
KLEBSIELLA SP DNA BLD POS QL NAA+NON-PRB: NOT DETECTED
KPC CARBAPENEMASE ISLT QL IA.RAPID: NOT DETECTED
L MONOCYTOG DNA CSF QL NAA+NON-PROBE: NOT DETECTED
LYMPHOCYTES # BLD AUTO: 0.56 K/UL (ref 1–4.8)
MCH RBC QN AUTO: 20.7 PG (ref 27–31)
MCHC RBC AUTO-ENTMCNC: 31.3 G/DL (ref 32–36)
MCV RBC AUTO: 66 FL (ref 82–98)
MECA+MECC NOSE QL NAA+PROBE: ABNORMAL
MECA+MECC+MREJ ISLT/SPM QL: ABNORMAL
N MEN DNA CSF QL NAA+NON-PROBE: NOT DETECTED
NDM CARBAPENEMASE ISLT QL IA.RAPID: NOT DETECTED
NUCLEATED RBC (/100WBC) (OHS): 0 /100 WBC
OXA-48-LIKE CRBPNASE ISLT QL IA.RAPID: NOT DETECTED
P AERUGINOSA DNA BLD POS QL NAA+PROBE: NOT DETECTED
PLATELET # BLD AUTO: 330 K/UL (ref 150–450)
PMV BLD AUTO: 10.3 FL (ref 9.2–12.9)
POCT GLUCOSE: 167 MG/DL (ref 70–110)
POTASSIUM SERPL-SCNC: 4.1 MMOL/L (ref 3.5–5.1)
PROT SERPL-MCNC: 6 GM/DL (ref 6–8.4)
PROTEUS SP DNA BLD POS QL NAA+PROBE: NOT DETECTED
RBC # BLD AUTO: 3.92 M/UL (ref 4–5.4)
RELATIVE EOSINOPHIL (OHS): 0 %
RELATIVE LYMPHOCYTE (OHS): 3.7 % (ref 18–48)
RELATIVE MONOCYTE (OHS): 5 % (ref 4–15)
RELATIVE NEUTROPHIL (OHS): 90.1 % (ref 38–73)
S AUREUS DNA BLD POS QL NAA+PROBE: NOT DETECTED
S ENT+BONG DNA STL QL NAA+NON-PROBE: NOT DETECTED
S EPIDERMIDIS HSP60 BLD POS QL PROBE: NOT DETECTED
S LUGDUNENSIS SODA BLD POS QL PROBE: NOT DETECTED
S MALTOPH DNA BLD POS QL NAA+PROBE: NOT DETECTED
S MARCESCENS DNA BLD POS QL NAA+PROBE: NOT DETECTED
S PNEUM DNA CSF QL NAA+NON-PROBE: NOT DETECTED
S PYOGENES HSP60 BLD POS QL PROBE: NOT DETECTED
SODIUM SERPL-SCNC: 136 MMOL/L (ref 136–145)
STAPH SP TUF BLD POS QL PROBE: NOT DETECTED
STREPTOCOCCUS SP TUF BLD POS QL PROBE: NOT DETECTED
VAN(A+B+C1+C2) GENES ISLT/SPM: ABNORMAL
VIM CARBAPENEMASE ISLT QL IA.RAPID: NOT DETECTED
WBC # BLD AUTO: 15.34 K/UL (ref 3.9–12.7)

## 2025-07-21 PROCEDURE — 97162 PT EVAL MOD COMPLEX 30 MIN: CPT

## 2025-07-21 PROCEDURE — 25000003 PHARM REV CODE 250: Performed by: SPECIALIST

## 2025-07-21 PROCEDURE — 25000003 PHARM REV CODE 250: Performed by: FAMILY MEDICINE

## 2025-07-21 PROCEDURE — 25000003 PHARM REV CODE 250

## 2025-07-21 PROCEDURE — S4991 NICOTINE PATCH NONLEGEND: HCPCS | Performed by: FAMILY MEDICINE

## 2025-07-21 PROCEDURE — 85025 COMPLETE CBC W/AUTO DIFF WBC: CPT | Performed by: SPECIALIST

## 2025-07-21 PROCEDURE — 80053 COMPREHEN METABOLIC PANEL: CPT | Performed by: SPECIALIST

## 2025-07-21 PROCEDURE — 21400001 HC TELEMETRY ROOM

## 2025-07-21 PROCEDURE — 63600175 PHARM REV CODE 636 W HCPCS: Performed by: SPECIALIST

## 2025-07-21 PROCEDURE — 97530 THERAPEUTIC ACTIVITIES: CPT

## 2025-07-21 PROCEDURE — 11000001 HC ACUTE MED/SURG PRIVATE ROOM

## 2025-07-21 RX ORDER — TALC
6 POWDER (GRAM) TOPICAL NIGHTLY PRN
Status: DISCONTINUED | OUTPATIENT
Start: 2025-07-21 | End: 2025-07-23 | Stop reason: HOSPADM

## 2025-07-21 RX ORDER — MEROPENEM 1 G/1
1 INJECTION, POWDER, FOR SOLUTION INTRAVENOUS
Status: DISCONTINUED | OUTPATIENT
Start: 2025-07-21 | End: 2025-07-22

## 2025-07-21 RX ORDER — POLYETHYLENE GLYCOL 3350 17 G/17G
17 POWDER, FOR SOLUTION ORAL DAILY
Status: DISCONTINUED | OUTPATIENT
Start: 2025-07-21 | End: 2025-07-23 | Stop reason: HOSPADM

## 2025-07-21 RX ORDER — ACETAMINOPHEN 325 MG/1
650 TABLET ORAL EVERY 6 HOURS PRN
Status: DISCONTINUED | OUTPATIENT
Start: 2025-07-21 | End: 2025-07-23 | Stop reason: HOSPADM

## 2025-07-21 RX ADMIN — MUPIROCIN: 20 OINTMENT TOPICAL at 08:07

## 2025-07-21 RX ADMIN — MORPHINE SULFATE 1 MG: 4 INJECTION INTRAVENOUS at 02:07

## 2025-07-21 RX ADMIN — HYDROCODONE BITARTRATE AND ACETAMINOPHEN 1 TABLET: 5; 325 TABLET ORAL at 04:07

## 2025-07-21 RX ADMIN — GABAPENTIN 200 MG: 100 CAPSULE ORAL at 08:07

## 2025-07-21 RX ADMIN — MEROPENEM 500 MG: 500 INJECTION INTRAVENOUS at 12:07

## 2025-07-21 RX ADMIN — FAMOTIDINE 20 MG: 20 TABLET, FILM COATED ORAL at 08:07

## 2025-07-21 RX ADMIN — GABAPENTIN 200 MG: 100 CAPSULE ORAL at 02:07

## 2025-07-21 RX ADMIN — HYDROCORTISONE SODIUM SUCCINATE 100 MG: 100 INJECTION, POWDER, FOR SOLUTION INTRAMUSCULAR; INTRAVENOUS at 10:07

## 2025-07-21 RX ADMIN — HYDROCORTISONE SODIUM SUCCINATE 100 MG: 100 INJECTION, POWDER, FOR SOLUTION INTRAMUSCULAR; INTRAVENOUS at 05:07

## 2025-07-21 RX ADMIN — MEROPENEM 1 G: 1 INJECTION INTRAVENOUS at 10:07

## 2025-07-21 RX ADMIN — SUCRALFATE 1 G: 1 SUSPENSION ORAL at 12:07

## 2025-07-21 RX ADMIN — HYDROCORTISONE SODIUM SUCCINATE 100 MG: 100 INJECTION, POWDER, FOR SOLUTION INTRAMUSCULAR; INTRAVENOUS at 02:07

## 2025-07-21 RX ADMIN — MUPIROCIN: 20 OINTMENT TOPICAL at 09:07

## 2025-07-21 RX ADMIN — MORPHINE SULFATE 1 MG: 4 INJECTION INTRAVENOUS at 09:07

## 2025-07-21 RX ADMIN — NICOTINE 1 PATCH: 14 PATCH, EXTENDED RELEASE TRANSDERMAL at 08:07

## 2025-07-21 RX ADMIN — GABAPENTIN 200 MG: 100 CAPSULE ORAL at 09:07

## 2025-07-21 RX ADMIN — HYDROCODONE BITARTRATE AND ACETAMINOPHEN 1 TABLET: 5; 325 TABLET ORAL at 12:07

## 2025-07-21 NOTE — EICU
Virtual ICU Quality Rounds    Admit Date: 7/20/2025  Hospital Day: 1    ICU Day: 20h    24H Vital Sign Range:  Temp:  [97.4 °F (36.3 °C)-98.7 °F (37.1 °C)]   Pulse:  []   Resp:  [15-35]   BP: ()/(42-94)   SpO2:  [91 %-100 %]     VICU Surveillance Screening  Daily review of  line necessity(optional): Central line(s) in place  Central line type (optional): Triple lumen catheter  Central line indications : Poor IV access  Rojas Indications : Critically ill in the intensive care unit requiring hourly urine output monitoring   LDA reconciliation : Yes  Central line best practices : Consider removal if patient has no central line indications for over 24hrs  Rojas best practices : Nurse driven rojas removal protocol ordered

## 2025-07-21 NOTE — PLAN OF CARE
Problem: Infection  Goal: Absence of Infection Signs and Symptoms  Outcome: Progressing     Problem: Adult Inpatient Plan of Care  Goal: Plan of Care Review  Outcome: Progressing  Goal: Patient-Specific Goal (Individualized)  Outcome: Progressing  Goal: Absence of Hospital-Acquired Illness or Injury  Outcome: Progressing  Goal: Optimal Comfort and Wellbeing  Outcome: Progressing  Goal: Readiness for Transition of Care  Outcome: Progressing     Problem: Sepsis/Septic Shock  Goal: Optimal Coping  Outcome: Progressing  Goal: Absence of Bleeding  Outcome: Progressing  Goal: Blood Glucose Level Within Targeted Range  Outcome: Progressing  Goal: Absence of Infection Signs and Symptoms  Outcome: Progressing  Goal: Optimal Nutrition Intake  Outcome: Progressing     Problem: Acute Kidney Injury/Impairment  Goal: Fluid and Electrolyte Balance  Outcome: Progressing  Goal: Improved Oral Intake  Outcome: Progressing  Goal: Effective Renal Function  Outcome: Progressing     Problem: Skin Injury Risk Increased  Goal: Skin Health and Integrity  Outcome: Progressing

## 2025-07-21 NOTE — SUBJECTIVE & OBJECTIVE
Objective:     Vital Signs (Most Recent):  Temp: 98.2 °F (36.8 °C) (07/21/25 0301)  Pulse: 67 (07/21/25 0600)  Resp: 20 (07/21/25 0600)  BP: (!) 121/58 (07/21/25 0600)  SpO2: 96 % (07/21/25 0600) Vital Signs (24h Range):  Temp:  [97.4 °F (36.3 °C)-98.7 °F (37.1 °C)] 98.2 °F (36.8 °C)  Pulse:  [] 67  Resp:  [15-35] 20  SpO2:  [91 %-100 %] 96 %  BP: ()/(42-94) 121/58     Weight: 96.6 kg (213 lb)  Body mass index is 36.56 kg/m².      Intake/Output Summary (Last 24 hours) at 7/21/2025 0834  Last data filed at 7/21/2025 0500  Gross per 24 hour   Intake 2810.41 ml   Output 2550 ml   Net 260.41 ml        Physical Exam  Vitals and nursing note reviewed.   Constitutional:       General: She is not in acute distress.     Appearance: She is ill-appearing. She is not toxic-appearing or diaphoretic.   Eyes:      Pupils: Pupils are equal, round, and reactive to light.   Cardiovascular:      Rate and Rhythm: Normal rate.      Pulses: Normal pulses.   Pulmonary:      Effort: Pulmonary effort is normal.   Abdominal:      Palpations: Abdomen is soft.   Neurological:      General: No focal deficit present.      Mental Status: She is alert.           Review of Systems   All other systems reviewed and are negative.      Vents:       Lines/Drains/Airways       Central Venous Catheter Line  Duration             Percutaneous Central Line Triple Lumen 07/20/25 0846 Femoral Vein Right;Femoral Right <1 day              Drain  Duration                  Urethral Catheter 07/20/25 0745 Non-latex;Double-lumen 16 Fr. 1 day              Peripheral Intravenous Line  Duration             Peripheral IV 07/20/25 0657 20 G Anterior;Left Upper Arm 1 day    Peripheral IV 07/20/25 0706 20 G Anterior;Right Upper Arm 1 day                    Significant Labs:    CBC/Anemia Profile:  Recent Labs   Lab 07/20/25  0655 07/20/25  1640 07/21/25  0454   WBC 20.00* 18.45* 15.34*   HGB 6.4* 6.4* 8.1*   HCT 22.2* 22.2* 25.9*    347 330   MCV 60*  63* 66*   RDW 20.4* 22.1* 24.9*        Chemistries:  Recent Labs   Lab 07/20/25  0655 07/21/25  0454   * 136   K 4.0 4.1   CL 95 107   CO2 19* 21*   BUN 33* 29*   CREATININE 3.4* 1.7*   CALCIUM 10.1 9.3   ALBUMIN 2.7* 2.2*   PROT 7.0 6.0   BILITOT 0.8 0.5   ALKPHOS 107 107   ALT 35 28   AST 36 20       All pertinent labs within the past 24 hours have been reviewed.    Significant Imaging:  I have reviewed all pertinent imaging results/findings within the past 24 hours.

## 2025-07-21 NOTE — PROGRESS NOTES
Pharmacist Renal Dose Adjustment Note    Delicia Moe is a 49 y.o. female being treated with the medication meropenem for UTI/pyelonephritis.     Patient Data:    Vital Signs (Most Recent):  Temp: 97.8 °F (36.6 °C) (07/21/25 0705)  Pulse: 76 (07/21/25 0900)  Resp: 17 (07/21/25 0900)  BP: 126/61 (07/21/25 0900)  SpO2: 95 % (07/21/25 0900) Vital Signs (72h Range):  Temp:  [97.4 °F (36.3 °C)-98.7 °F (37.1 °C)]   Pulse:  []   Resp:  [15-35]   BP: ()/(38-94)   SpO2:  [90 %-100 %]      Recent Labs   Lab 07/20/25  0655 07/21/25  0454   CREATININE 3.4* 1.7*     Serum creatinine: 1.7 mg/dL (H) 07/21/25 0454  Estimated creatinine clearance: 45.7 mL/min (A)    Per protocol for a mild to moderate infection & CrCl 25-49 ml/min, dose will be increased from 500 mg IV every 12 hours to 1000 mg IV every 12 hours.     Pharmacist's Name: Katherine E Mcardle  Pharmacist's Extension: [phone]

## 2025-07-21 NOTE — PLAN OF CARE
PT eval complete. Patient currently requires MIN A for bed mobility, STS with RW, and MOD A for step transfer to chair with RW. Patient is self-limiting due to pain.  D/C rec is TBD pending patient participation.

## 2025-07-21 NOTE — NURSING TRANSFER
Nursing Transfer Note      7/21/2025   4:37 PM    Nurse giving handoff:JJ Garsia  Nurse receiving handoff:JJ Parks    Reason patient is being transferred: downgrade    Transfer From: ICU 9    Transfer via recliner    Transfer with cardiac monitoring    Transported by JJ Garsia    Transfer Vital Signs:  Blood Pressure:93/54  Heart Rate:101  O2:97  Temperature:98.1  Respirations:25    Telemetry: Rhythm ST  Order for Tele Monitor? Yes    Additional Lines: Muro Catheter    Medicines sent: Steroid, Mupirocin    Any special needs or follow-up needed: chronic back pain    Patient belongings transferred with patient: Yes    Chart send with patient: Yes    Notified: daughter      Upon arrival to floor: cardiac monitor applied, patient oriented to room, call bell in reach, and bed in lowest position

## 2025-07-21 NOTE — CONSULTS
[facility] - Intensive Care (Hospital)  Initial Discharge Assessment       Primary Care Provider: Baldev Venegas MD    Admission Diagnosis: Screening for cardiovascular condition [Z13.6]  Acute cystitis without hematuria [N30.00]  FERMIN (acute kidney injury) [N17.9]  Hypotension, unspecified hypotension type [I95.9]  Anemia, unspecified type [D64.9]  Sepsis, due to unspecified organism, unspecified whether acute organ dysfunction present [A41.9]    Admission Date: 7/20/2025  Expected Discharge Date:     Transition of Care Barriers: Social    Payor: BLUE [employer] / Plan: [employer] [place] HMO / Product Type: HMO /     Extended Emergency Contact Information  Primary Emergency Contact: MirmingoIglesiae  Address: [address] [place] [address]           YUSUF DAHL [address] [facility]  Home Phone: [phone]  Mobile Phone: [phone]  Relation: Spouse  Secondary Emergency Contact: PayalLilliam  Mobile Phone: [phone]  Relation: Daughter    Discharge Plan A: Skilled Nursing Facility  Discharge Plan B: Rehab      [id number] - [place], [place] - [address] [facility] & [id number]  [place] [address]  Phone: [phone] Fax: [phone]      Initial Assessment (most recent)       Adult Discharge Assessment - 07/21/25 1237          Discharge Assessment    Assessment Type Discharge Planning Assessment     Source of Information family     Communicated NICOLETTE with patient/caregiver Date not available/Unable to determine     People in Home spouse     Name(s) of People in Home EugenioPhil     Do you expect to return to your current living situation? No     Do you have help at home or someone to help you manage your care at home? No     Prior to hospitilization cognitive status: Alert/Oriented     Current cognitive status: Not Oriented to Time     Walking or Climbing Stairs Difficulty yes     Walking or Climbing Stairs ambulation difficulty, requires equipment     Mobility  Management rollator     Dressing/Bathing Difficulty yes     Dressing/Bathing bathing difficulty, requires equipment     Dressing/Bathing Management shower bench     Home Accessibility stairs to enter home     Number of Stairs, Main Entrance five     Home Layout Able to live on 1st floor     Equipment Currently Used at Home rollator;shower chair     Readmission within 30 days? No     Patient currently being followed by outpatient case management? No     Do you currently have service(s) that help you manage your care at home? No     Do you take prescription medications? Yes     Do you have prescription coverage? Yes     Do you have any problems affording any of your prescribed medications? No     Is the patient taking medications as prescribed? yes     Who is going to help you get home at discharge? daughter or spouse     How do you get to doctors appointments? car, drives self;family or friend will provide     Are you on dialysis? No     Do you take coumadin? No     Discharge Plan A Skilled Nursing Facility     Discharge Plan B Rehab     DME Needed Upon Discharge  none     Discharge Plan discussed with: Spouse/sig other     Transition of Care Barriers Social                   Anticipated DC dispo: SNF vs rehab   Prior Level of Function: independent with ADLs, uses rollator for ambulation   People in home: spousePhil       Comments:  CM met with patient's spouse to introduce role and discuss discharge planning. Spouse is a  and will not be able to help at discharge. Verbalized concerns about patient discharging home alone. He is concerned about falls and overmedication as well as personal hygiene. Advised therapy was ordered today for evaluations and possible placement. Discussed what services insurance will cover. Confirmed demographics, insurance, and emergency contacts. CM discharge needs depends on hospital progress. CM will continue following to assist with other needs. Discharge plan has been  determined by review of patient's clinical status, future medical and therapeutic needs, and coverage/benefits for post-acute care in coordination with multidisciplinary team members.

## 2025-07-21 NOTE — PROGRESS NOTES
[facility]  Critical Care Medicine  Progress Note    Patient Name: Delicia Moe  MRN: [id number]  Admission Date: 7/20/2025  Hospital Length of Stay: 1 days  Code Status: Prior  Attending Provider: Tana Fong MD  Primary Care Provider: Baldev Venegas MD   Principal Problem: Septic shock    Subjective:     HPI:  49 y.o. female patient with a PMHx of HTN and spinal stenosis who presents to the Emergency Department for concerns of septic UTI. Pt was sent to the ED from [facility] for ICU admission. The pt initially came for lower back pain. The pain is chronic and has been evaluated by Dr. Montaño (Spine Specialist) who believes the available surgical procedure may not provide pain relief but would stabilize the spine. Symptoms are constant and moderate in severity. No mitigating or exacerbating factors reported. Associated sxs include generalized weakness and fatigue. Patient denies any CP, SOB, fever, SOB, and all other sxs at this time. No further complaints or concerns at this time. She has been on an escalating dose of Neurontin for years and also has been on prednisone 20 mg a day recently reduced to 10 mg a day for over 10 years. She also takes other pain meds like norco but adamantly denies NSAIDS. She also a long hx of heavy menses and has had chronic anemia. She had prolong heavy menses a couple weeks ago and there has been talks about need for hysterectomy. She denies abdominal pain, N/V, Blood in stools or any other source of bleeding.            Hospital/ICU Course:  7/21 No hemodynamic or resp issues.        Objective:     Vital Signs (Most Recent):  Temp: 98.2 °F (36.8 °C) (07/21/25 0301)  Pulse: 67 (07/21/25 0600)  Resp: 20 (07/21/25 0600)  BP: (!) 121/58 (07/21/25 0600)  SpO2: 96 % (07/21/25 0600) Vital Signs (24h Range):  Temp:  [97.4 °F (36.3 °C)-98.7 °F (37.1 °C)] 98.2 °F (36.8 °C)  Pulse:  [] 67  Resp:  [15-35] 20  SpO2:  [91 %-100 %] 96 %  BP:  ()/(42-94) 121/58     Weight: 96.6 kg (213 lb)  Body mass index is 36.56 kg/m².      Intake/Output Summary (Last 24 hours) at 7/21/2025 0834  Last data filed at 7/21/2025 0500  Gross per 24 hour   Intake 2810.41 ml   Output 2550 ml   Net 260.41 ml        Physical Exam  Vitals and nursing note reviewed.   Constitutional:       General: She is not in acute distress.     Appearance: She is ill-appearing. She is not toxic-appearing or diaphoretic.   Eyes:      Pupils: Pupils are equal, round, and reactive to light.   Cardiovascular:      Rate and Rhythm: Normal rate.      Pulses: Normal pulses.   Pulmonary:      Effort: Pulmonary effort is normal.   Abdominal:      Palpations: Abdomen is soft.   Neurological:      General: No focal deficit present.      Mental Status: She is alert.           Review of Systems   All other systems reviewed and are negative.      Vents:       Lines/Drains/Airways       Central Venous Catheter Line  Duration             Percutaneous Central Line Triple Lumen 07/20/25 0846 Femoral Vein Right;Femoral Right <1 day              Drain  Duration                  Urethral Catheter 07/20/25 0745 Non-latex;Double-lumen 16 Fr. 1 day              Peripheral Intravenous Line  Duration             Peripheral IV 07/20/25 0657 20 G Anterior;Left Upper Arm 1 day    Peripheral IV 07/20/25 0706 20 G Anterior;Right Upper Arm 1 day                    Significant Labs:    CBC/Anemia Profile:  Recent Labs   Lab 07/20/25  0655 07/20/25  1640 07/21/25  0454   WBC 20.00* 18.45* 15.34*   HGB 6.4* 6.4* 8.1*   HCT 22.2* 22.2* 25.9*    347 330   MCV 60* 63* 66*   RDW 20.4* 22.1* 24.9*        Chemistries:  Recent Labs   Lab 07/20/25  0655 07/21/25  0454   * 136   K 4.0 4.1   CL 95 107   CO2 19* 21*   BUN 33* 29*   CREATININE 3.4* 1.7*   CALCIUM 10.1 9.3   ALBUMIN 2.7* 2.2*   PROT 7.0 6.0   BILITOT 0.8 0.5   ALKPHOS 107 107   ALT 35 28   AST 36 20       All pertinent labs within the past 24 hours have  been reviewed.    Significant Imaging:  I have reviewed all pertinent imaging results/findings within the past 24 hours.    ABG  Recent Labs   Lab 07/20/25  0822   PH 7.288*   PO2 32*   PCO2 41.0   HCO3 19.6*   BE -7*     Assessment/Plan:     Neuro  Chronic pain syndrome  Sec to spinal stenosis.  Continue neurontin, norco    Renal/  FERMIN (acute kidney injury)  FERMIN is likely due to pre-renal azotemia due to intravascular volume depletion. Baseline creatinine is unknown. Most recent creatinine and eGFR are listed below.  Recent Labs     07/20/25  0655 07/21/25  0454   CREATININE 3.4* 1.7*   EGFRNORACEVR 16* 37*        Plan  - FERMIN is stable  - Avoid nephrotoxins and renally dose meds for GFR listed above  - Monitor urine output, serial BMP, and adjust therapy as needed    7/21 Significantly better      ID  * Septic shock  This patient has shock. The type of shock is distributive due to sepsis. The patient has the following evidence of shock: persistent hypotension, FERMIN, and altered mental status. The patient will be admitted to an intensive care unit, they will be treated with   Careful hydration  PRBC  Abx and serial labs    7/21 No hemodynamic issues. Afebrile, WBC trends better.  .    Acute pyelonephritis  Continue Abx and follow cultures    Oncology  Severe anemia  Anemia is likely due to chronic blood loss. Most recent hemoglobin and hematocrit are listed below.  Recent Labs     07/20/25  0655 07/20/25  1640 07/21/25  0454   HGB 6.4* 6.4* 8.1*   HCT 22.2* 22.2* 25.9*       Plan  - Monitor serial CBC: Daily  - Transfuse PRBC if patient becomes hemodynamically unstable, symptomatic or H/H drops below 7/21.  - Patient has received 2 units of PRBCs on 7/20  - Patient's anemia is currently stable  - Sec to chronic heavy uterine bleeding per patient and     7/21 Improved. Needs outpatient Gyn follow up    Endocrine  Adrenal insufficiency  Sec to over a decade of steroid use  Will give stress dose of  hydrocortisone          Critical Care Time: 35 minutes  Critical secondary to Patient has a condition that poses threat to life and bodily function: Acute Renal Failure      Critical care was time spent personally by me on the following activities: development of treatment plan with patient or surrogate and bedside caregivers, discussions with consultants, evaluation of patient's response to treatment, examination of patient, ordering and performing treatments and interventions, ordering and review of laboratory studies, ordering and review of radiographic studies, pulse oximetry, re-evaluation of patient's condition. This critical care time did not overlap with that of any other provider or involve time for any procedures.     Tana Fong MD  Critical Care Medicine  [facility] - Intensive Care [facility])

## 2025-07-21 NOTE — ASSESSMENT & PLAN NOTE
Anemia is likely due to chronic blood loss. Most recent hemoglobin and hematocrit are listed below.  Recent Labs     07/20/25  0655 07/20/25  1640 07/21/25  0454   HGB 6.4* 6.4* 8.1*   HCT 22.2* 22.2* 25.9*       Plan  - Monitor serial CBC: Daily  - Transfuse PRBC if patient becomes hemodynamically unstable, symptomatic or H/H drops below 7/21.  - Patient has received 2 units of PRBCs on 7/20  - Patient's anemia is currently stable  - Sec to chronic heavy uterine bleeding per patient and     7/21 Improved. Needs outpatient Gyn follow up

## 2025-07-21 NOTE — PT/OT/SLP PROGRESS
Occupational Therapy      Patient Name:  Delicia Moe   MRN:  [id number]    Orders received and chart review completed. Patient declined OT evaluation for duration of stay stating that she does not need therapy. Education provided, yet adamantly refused. No therapy indicated.     LIAM Hicks  7/21/2025

## 2025-07-21 NOTE — PLAN OF CARE
Patient remained free of falls/injury/trauma throughout shift. Patient AAOx4. Neuro status unchanged. VSS. No complaints of chest pain or SOB. 3rd unit of blood administered and completed. Patient endorses neuropathy and leg/back pain. NP notified orders placed. PRN medications administered per patients request. Patient continues to void per the rojas cathter in place. Fall risk precautions reviewed and maintained with patient. POC reviewed with patient. Patient verbalized understanding.     Problem: Infection  Goal: Absence of Infection Signs and Symptoms  Outcome: Progressing     Problem: Adult Inpatient Plan of Care  Goal: Plan of Care Review  Outcome: Progressing  Goal: Patient-Specific Goal (Individualized)  Outcome: Progressing  Goal: Absence of Hospital-Acquired Illness or Injury  Outcome: Progressing  Goal: Optimal Comfort and Wellbeing  Outcome: Progressing  Goal: Readiness for Transition of Care  Outcome: Progressing     Problem: Sepsis/Septic Shock  Goal: Optimal Coping  Outcome: Progressing  Goal: Absence of Bleeding  Outcome: Progressing  Goal: Blood Glucose Level Within Targeted Range  Outcome: Progressing  Goal: Absence of Infection Signs and Symptoms  Outcome: Progressing  Goal: Optimal Nutrition Intake  Outcome: Progressing     Problem: Acute Kidney Injury/Impairment  Goal: Fluid and Electrolyte Balance  Outcome: Progressing  Goal: Improved Oral Intake  Outcome: Progressing  Goal: Effective Renal Function  Outcome: Progressing

## 2025-07-21 NOTE — EICU
Intervention Initiated From:  COR / CHRISU    Hu intervened regarding:  Rounding (Video assessment)    VICU Night Rounds Checklist  24H Vital Sign Range:  Temp:  [97.4 °F (36.3 °C)-98.7 °F (37.1 °C)]   Pulse:  []   Resp:  [16-32]   BP: ()/(38-87)   SpO2:  [90 %-100 %]     Video rounds and LDA reconciliation

## 2025-07-21 NOTE — PT/OT/SLP EVAL
Physical Therapy Evaluation and Treatment    Patient Name:  Delicia Moe   MRN:  [id number]    Recommendations:     Discharge Recommendations:  (TBD pending patient participation)   (Patient adamantly refused all therapy services after d/c including home health outpatient, SNF, rehab)  Discharge Equipment Recommendations: walker, rolling   Barriers to discharge: None    Assessment:     Delicia Moe is a 49 y.o. female admitted with a medical diagnosis of Septic shock.  She presents with the following impairments/functional limitations: weakness, impaired endurance, impaired sensation, impaired self care skills, impaired functional mobility, gait instability, impaired balance, decreased lower extremity function, pain, impaired cardiopulmonary response to activity.    Rehab Prognosis: Fair; patient would benefit from acute skilled PT services to address these deficits and reach maximum level of function.    Recent Surgery: * No surgery found *      Plan:     During this hospitalization, patient to be seen 3 x/week to address the identified rehab impairments via gait training, therapeutic activities and progress toward the following goals:    Plan of Care Expires:  08/04/25    Subjective     Chief Complaint: Patient c/o chronic back pain  Patient/Family Comments/goals: Patient is agreeable to therapy session  Pain/Comfort:  Pain Rating 1: 0/10    Patients cultural, spiritual, [employer]alism conflicts given the current situation:      Living Environment:  Patient lives with her  is a mobile home with 5 ROBLES. Does not and work and drives (or has family drive her). Patient's reports her  is a , so he is not at home all of the time. Patient also states she has two daughters that live close by that can help her as needed.  Prior to admission, patients level of function was MOD I with household/community distances and ADLs. Patient reports she uses her rollator for all distances, but she spends most  "of her time in the recliner at home. Per the patient, "recliner is life" Patient reports that she is very weak and in pain because she is in the hospital bed instead of a recliner.  Equipment used at home: shower chair, rollator.  DME owned (not currently used): none.  Upon discharge, patient will have assistance from  and TWO DAUGHTERS.    Objective:     Communicated with [facility] prior to session.  Patient found HOB elevated with telemetry, pulse ox (continuous), blood pressure cuff, rojas catheter, peripheral IV  upon PT entry to room.    General Precautions: Standard, fall  Orthopedic Precautions:N/A   Braces: N/A  Respiratory Status: Room air    Exams:  Cognitive Exam:  Patient is oriented to Person, Place, Time, and Situation  Sensation:    -       Impaired  -- B feet / Patient reports neuropathy in both feet  RLE ROM: WFL  RLE Strength: Grossly limited observed through functional mobility  LLE ROM: WFL  LLE Strength: Grossly limited observed through functional mobility    Functional Mobility:  Bed Mobility: MIN A  Patient required increased time due to pain to complete all bed mobility   Rolling L: MIN A  Patient required v/c for hand placement  Educated patient on log roll and was met with resistance  Observed spinal precautions just for comfort  SL to sit: MIN A  Patient required MIN A for trunk management  Seated scoot: MIN A  EOB sitting x 10 min   Patient reported increased pain during EOB sitting  Patient used BUE to hold body weight and offload spine throughout EOB sitting  Patient c/o back pain throughout EOB sitting while stating "you better hurry up" in reference to transferring  Transfers: MOD A  First sit to stand with RW: MOD A   Patient had to return to sitting to do c/o pain  Second sit to stand with RW and HHA from daughter: MOD A x 2   Patient requested daughter to  front of her to provide a HHA during STS  Step transfer to recliner w/ RW: MOD A  Patient c/o "14 million/10" back " "pain   Requesting morphine IV drip following transfer  Seated scoot posterior in chair: 5 attempts  Patient requested therapist lift her up in order to scoot. Therapist encouraged patient to participate in scooting  Patient required therapist to scoot her back in the chair  Gait: Patient unable to progress to ambulation at the moment due to patient self-limiting due to pain  Balance: Patient demonstrated fair/poor sitting balance and poor standing balance.    **Patient c/o pain throughout session. Patient self-limiting and using foul language throughout session due to reports of pain with all movement. Patient reports she is not going to get back in the bed. Patient reports she is content in the recliner.       AM-PAC 6 CLICK MOBILITY  Total Score:13       Education:  Role of PT and POC in acute care hospital setting  RW use and safety during transfers  Continue provided therapeutic exercises throughout the day to increase tolerance to activity and blood flow: 10 repetitions of seated hip flexion, LAQs, heel slides, and APs  Increase amount of time out of bed and seated in chair to patient tolerance  Patient was educated on risk for falls due to generalized weakness ("Call don't Fall").  Patient was encouraged to call for assistance with all needs, such as transfers or trips to the bathroom.  Patient was agreeable to all safety precautions.       Patient left up in chair with all lines intact and daughter present.    GOALS:   Multidisciplinary Problems       Physical Therapy Goals          Problem: Physical Therapy    Goal Priority Disciplines Outcome Interventions   Physical Therapy Goal     PT, PT/OT     Description: LTGs to be met in 14 days (8/4/25)  Patient will require MOD I for bed mobility  Patient will require MOD I for bed<> chair transfers  Patient will ambulate 200 feet with RW and MOD I  Patient will increase AM-PAC score by 2 points to progress with functional mobility                         DME " Justifications:   Delicia's mobility limitation cannot be sufficiently resolved by the use of a cane. Her functional mobility deficit can be sufficiently resolved with the use of a Rolling Walker. Patient's mobility limitation significantly impairs their ability to participate in one of more activities of daily living.  The use of a RW will significantly improve the patient's ability to participate in MRADLS and the patient will use it on regular basis in the home.    History:     Past Medical History:   Diagnosis Date    Chronic back pain     Hypertension     Migraines     Obese     Spinal stenosis        Past Surgical History:   Procedure Laterality Date    COLPOSCOPY         Time Tracking:     PT Received On: 07/21/25  PT Start Time: 1145     PT Stop Time: 1215  PT Total Time (min): 30 min     Billable Minutes: Evaluation 15 and Therapeutic Activity 15      07/21/2025

## 2025-07-21 NOTE — ASSESSMENT & PLAN NOTE
This patient has shock. The type of shock is distributive due to sepsis. The patient has the following evidence of shock: persistent hypotension, FERMIN, and altered mental status. The patient will be admitted to an intensive care unit, they will be treated with   Careful hydration  PRBC  Abx and serial labs    7/21 No hemodynamic issues. Afebrile, WBC trends better.  .

## 2025-07-21 NOTE — ASSESSMENT & PLAN NOTE
FERMIN is likely due to pre-renal azotemia due to intravascular volume depletion. Baseline creatinine is unknown. Most recent creatinine and eGFR are listed below.  Recent Labs     07/20/25  0655 07/21/25  0454   CREATININE 3.4* 1.7*   EGFRNORACEVR 16* 37*        Plan  - FERMIN is stable  - Avoid nephrotoxins and renally dose meds for GFR listed above  - Monitor urine output, serial BMP, and adjust therapy as needed    7/21 Significantly better

## 2025-07-22 LAB
ABSOLUTE EOSINOPHIL (OHS): 0.01 K/UL
ABSOLUTE MONOCYTE (OHS): 0.92 K/UL (ref 0.3–1)
ABSOLUTE NEUTROPHIL COUNT (OHS): 19.19 K/UL (ref 1.8–7.7)
ALBUMIN SERPL BCP-MCNC: 2.3 G/DL (ref 3.5–5.2)
ALP SERPL-CCNC: 132 UNIT/L (ref 40–150)
ALT SERPL W/O P-5'-P-CCNC: 22 UNIT/L (ref 10–44)
ANION GAP (OHS): 8 MMOL/L (ref 8–16)
AST SERPL-CCNC: 16 UNIT/L (ref 11–45)
BACTERIA UR CULT: ABNORMAL
BASOPHILS # BLD AUTO: 0.03 K/UL
BASOPHILS NFR BLD AUTO: 0.1 %
BILIRUB SERPL-MCNC: 0.5 MG/DL (ref 0.1–1)
BUN SERPL-MCNC: 35 MG/DL (ref 6–20)
CALCIUM SERPL-MCNC: 9.7 MG/DL (ref 8.7–10.5)
CHLORIDE SERPL-SCNC: 105 MMOL/L (ref 95–110)
CO2 SERPL-SCNC: 21 MMOL/L (ref 23–29)
CREAT SERPL-MCNC: 1.5 MG/DL (ref 0.5–1.4)
ERYTHROCYTE [DISTWIDTH] IN BLOOD BY AUTOMATED COUNT: 25.3 % (ref 11.5–14.5)
GFR SERPLBLD CREATININE-BSD FMLA CKD-EPI: 43 ML/MIN/1.73/M2
GLUCOSE SERPL-MCNC: 154 MG/DL (ref 70–110)
HCT VFR BLD AUTO: 27.8 % (ref 37–48.5)
HGB BLD-MCNC: 8.3 GM/DL (ref 12–16)
IMM GRANULOCYTES # BLD AUTO: 0.18 K/UL (ref 0–0.04)
IMM GRANULOCYTES NFR BLD AUTO: 0.8 % (ref 0–0.5)
LYMPHOCYTES # BLD AUTO: 0.92 K/UL (ref 1–4.8)
MCH RBC QN AUTO: 20.1 PG (ref 27–31)
MCHC RBC AUTO-ENTMCNC: 29.9 G/DL (ref 32–36)
MCV RBC AUTO: 68 FL (ref 82–98)
NUCLEATED RBC (/100WBC) (OHS): 0 /100 WBC
PLATELET # BLD AUTO: 358 K/UL (ref 150–450)
PMV BLD AUTO: 10.6 FL (ref 9.2–12.9)
POTASSIUM SERPL-SCNC: 4.2 MMOL/L (ref 3.5–5.1)
PROT SERPL-MCNC: 6.3 GM/DL (ref 6–8.4)
RBC # BLD AUTO: 4.12 M/UL (ref 4–5.4)
RELATIVE EOSINOPHIL (OHS): 0 %
RELATIVE LYMPHOCYTE (OHS): 4.3 % (ref 18–48)
RELATIVE MONOCYTE (OHS): 4.3 % (ref 4–15)
RELATIVE NEUTROPHIL (OHS): 90.5 % (ref 38–73)
SODIUM SERPL-SCNC: 134 MMOL/L (ref 136–145)
WBC # BLD AUTO: 21.25 K/UL (ref 3.9–12.7)

## 2025-07-22 PROCEDURE — 97116 GAIT TRAINING THERAPY: CPT

## 2025-07-22 PROCEDURE — 21400001 HC TELEMETRY ROOM

## 2025-07-22 PROCEDURE — 97530 THERAPEUTIC ACTIVITIES: CPT

## 2025-07-22 PROCEDURE — 63600175 PHARM REV CODE 636 W HCPCS: Performed by: SPECIALIST

## 2025-07-22 PROCEDURE — 85025 COMPLETE CBC W/AUTO DIFF WBC: CPT | Performed by: SPECIALIST

## 2025-07-22 PROCEDURE — 36415 COLL VENOUS BLD VENIPUNCTURE: CPT | Performed by: SPECIALIST

## 2025-07-22 PROCEDURE — 25000003 PHARM REV CODE 250: Performed by: SPECIALIST

## 2025-07-22 PROCEDURE — 63600175 PHARM REV CODE 636 W HCPCS: Performed by: EMERGENCY MEDICINE

## 2025-07-22 PROCEDURE — 82040 ASSAY OF SERUM ALBUMIN: CPT | Performed by: SPECIALIST

## 2025-07-22 PROCEDURE — 25000003 PHARM REV CODE 250

## 2025-07-22 PROCEDURE — S4991 NICOTINE PATCH NONLEGEND: HCPCS | Performed by: FAMILY MEDICINE

## 2025-07-22 PROCEDURE — 25000003 PHARM REV CODE 250: Performed by: FAMILY MEDICINE

## 2025-07-22 RX ORDER — CEFTRIAXONE 2 G/1
2 INJECTION, POWDER, FOR SOLUTION INTRAMUSCULAR; INTRAVENOUS
Status: DISCONTINUED | OUTPATIENT
Start: 2025-07-22 | End: 2025-07-23

## 2025-07-22 RX ADMIN — ALUMINUM HYDROXIDE, MAGNESIUM HYDROXIDE, AND DIMETHICONE 30 ML: 200; 20; 200 SUSPENSION ORAL at 09:07

## 2025-07-22 RX ADMIN — CEFTRIAXONE SODIUM 2 G: 2 INJECTION, POWDER, FOR SOLUTION INTRAMUSCULAR; INTRAVENOUS at 11:07

## 2025-07-22 RX ADMIN — GABAPENTIN 200 MG: 100 CAPSULE ORAL at 09:07

## 2025-07-22 RX ADMIN — HYDROCORTISONE SODIUM SUCCINATE 100 MG: 100 INJECTION, POWDER, FOR SOLUTION INTRAMUSCULAR; INTRAVENOUS at 06:07

## 2025-07-22 RX ADMIN — MUPIROCIN: 20 OINTMENT TOPICAL at 08:07

## 2025-07-22 RX ADMIN — HYDROCODONE BITARTRATE AND ACETAMINOPHEN 1 TABLET: 5; 325 TABLET ORAL at 05:07

## 2025-07-22 RX ADMIN — NICOTINE 1 PATCH: 14 PATCH, EXTENDED RELEASE TRANSDERMAL at 08:07

## 2025-07-22 RX ADMIN — GABAPENTIN 200 MG: 100 CAPSULE ORAL at 02:07

## 2025-07-22 RX ADMIN — GABAPENTIN 200 MG: 100 CAPSULE ORAL at 08:07

## 2025-07-22 RX ADMIN — HYDROCODONE BITARTRATE AND ACETAMINOPHEN 1 TABLET: 5; 325 TABLET ORAL at 07:07

## 2025-07-22 RX ADMIN — HYDROCORTISONE SODIUM SUCCINATE 100 MG: 100 INJECTION, POWDER, FOR SOLUTION INTRAMUSCULAR; INTRAVENOUS at 02:07

## 2025-07-22 RX ADMIN — FAMOTIDINE 20 MG: 20 TABLET, FILM COATED ORAL at 08:07

## 2025-07-22 RX ADMIN — MORPHINE SULFATE 1 MG: 4 INJECTION INTRAVENOUS at 11:07

## 2025-07-22 RX ADMIN — MUPIROCIN: 20 OINTMENT TOPICAL at 09:07

## 2025-07-22 NOTE — PLAN OF CARE
Pt tolerated interventions well. Required SBA for STS, ambulated 70ft SBA using RW. Recommending low intensity therapy upon d/c.

## 2025-07-22 NOTE — PLAN OF CARE
Problem: Adult Inpatient Plan of Care  Goal: Plan of Care Review  Outcome: Progressing  Goal: Patient-Specific Goal (Individualized)  Outcome: Progressing  Goal: Absence of Hospital-Acquired Illness or Injury  Outcome: Progressing     Problem: Sepsis/Septic Shock  Goal: Absence of Bleeding  Outcome: Progressing  Goal: Blood Glucose Level Within Targeted Range  Outcome: Progressing

## 2025-07-22 NOTE — ASSESSMENT & PLAN NOTE
FERMIN is likely due to pre-renal azotemia due to dehydration. Baseline creatinine is unknown. Most recent creatinine and eGFR are listed below.  Recent Labs     07/20/25  0655 07/21/25  0454   CREATININE 3.4* 1.7*   EGFRNORACEVR 16* 37*      Plan  - FERMIN is improving  - Avoid nephrotoxins and renally dose meds for GFR listed above  - Monitor urine output, serial BMP, and adjust therapy as needed  - continue IVF

## 2025-07-22 NOTE — PT/OT/SLP PROGRESS
Physical Therapy Treatment    Patient Name:  Delicia Moe   MRN:  [id number]    Recommendations:     Discharge Recommendations: Low Intensity Therapy  Discharge Equipment Recommendations: walker, rolling  Barriers to discharge: None    Assessment:     Delicia Moe is a 49 y.o. female admitted with a medical diagnosis of Septic shock.  She presents with the following impairments/functional limitations: weakness, impaired endurance, impaired functional mobility, gait instability, impaired balance, pain, decreased safety awareness, decreased lower extremity function, decreased ROM, impaired cardiopulmonary response to activity, impaired sensation.    Rehab Prognosis: Good; patient would benefit from acute skilled PT services to address these deficits and reach maximum level of function.    Recent Surgery: * No surgery found *      Plan:     During this hospitalization, patient to be seen 3 x/week to address the identified rehab impairments via gait training, therapeutic exercises, therapeutic activities and progress toward the following goals:    Plan of Care Expires:  08/04/25    Subjective     Chief Complaint: Pt is motivated to participate  Patient/Family Comments/goals: none stated  Pain/Comfort:  Pain Rating 1: 3/10  Location - Side 1: Bilateral  Location - Orientation 1: generalized  Location 1: back (chronic)  Pain Addressed 1: Reposition, Pre-medicate for activity, Distraction  Pain Rating Post-Intervention 1: 3/10      Objective:     Communicated with nurse and epic chart review prior to session.  Patient found up in chair with peripheral IV, telemetry upon PT entry to room.     General Precautions: Standard, fall  Orthopedic Precautions: N/A  Braces: N/A  Respiratory Status: Room air     Functional Mobility:  Gait Belt Applied - Yes   Socks/Shoes Donned - Yes  Bed Mobility  Seated Anterior Scooting: stand by assistance  Seated Posterior Scooting: stand by assistance  Seated in chair at start of session  "and returned to chair  Transfers  Sit to Stand: stand by assistance with rolling walker  Transfer to Chair: stand by assistance with rolling walker using Step Transfer  Gait  Patient ambulated 70ft with rolling walker and stand by assistance. Patient demonstrates occasional unsteady gait and flexed posture, slowed pace. No c/o dizziness, no gross LOB. Pt required verbal cuing to improve posture. Mild SOB after ambulation, educated about pursed lip breathing technique and cued for use with mobility. All lines remained intact throughout ambulation trial.  Balance  Sitting: supervision  Standing: stand by assistance  Increased time needed for all mobility and education.     AM-PAC 6 CLICK MOBILITY  Turning over in bed (including adjusting bedclothes, sheets and blankets)?: 3  Sitting down on and standing up from a chair with arms (e.g., wheelchair, bedside commode, etc.): 3  Moving from lying on back to sitting on the side of the bed?: 3  Moving to and from a bed to a chair (including a wheelchair)?: 3  Need to walk in hospital room?: 3  Climbing 3-5 steps with a railing?: 1 (NT)  Basic Mobility Total Score: 16       Treatment & Education:  Reviewed role of PT in acute care and POC. Pt tolerated interventions well. Reviewed importance of OOB activities, activity pacing, and HEP (marching/hip flex, hip abd, heel slides/LAQ, quad sets, ankle pumps) in order to maintain/regain strength. Encouraged to sit up in chair for all meals. Reviewed proper use of RW for safety and to reduce risk of falling. Educated on safety of RW vs Rollator. Educated on importance of consistent participation with therapy. Educated on activity pacing for pain control and to reduce fatigue. Reviewed "call don't fall" policy and increased risk of falling due to weakness, instructed to utilize call bell for assistance with all transfers. Pt agreeable to all requests.    Patient left up in chair with all lines intact, call button in reach, and PCT " present..    GOALS:   Multidisciplinary Problems       Physical Therapy Goals          Problem: Physical Therapy    Goal Priority Disciplines Outcome Interventions   Physical Therapy Goal     PT, PT/OT Progressing    Description: LTGs to be met in 14 days (8/4/25)  Patient will require MOD I for bed mobility  Patient will require MOD I for bed<> chair transfers  Patient will ambulate 200 feet with RW and MOD I  Patient will increase AM-PAC score by 2 points to progress with functional mobility                         DME Justifications:   Delicia's mobility limitation cannot be sufficiently resolved by the use of a cane. Her functional mobility deficit can be sufficiently resolved with the use of a Rolling Walker. Patient's mobility limitation significantly impairs their ability to participate in one of more activities of daily living.  The use of a RW will significantly improve the patient's ability to participate in MRADLS and the patient will use it on regular basis in the home.    Time Tracking:     PT Received On: 07/22/25  PT Start Time: 1130     PT Stop Time: 1200  PT Total Time (min): 30 min     Billable Minutes: Gait Training 15min and Therapeutic Activity 15min    Treatment Type: Treatment  PT/PTA: PT     Number of PTA visits since last PT visit: 0     07/22/2025

## 2025-07-22 NOTE — HPI
49 y.o. female patient with a PMHx of HTN and spinal stenosis who presents to the Emergency Department for concerns of septic UTI. Pt was sent to the ED from [facility] for ICU admission. The pt initially came for lower back pain. The pain is chronic and has been evaluated by Dr. Montaño (Spine Specialist) who believes the available surgical procedure may not provide pain relief but would stabilize the spine. Symptoms are constant and moderate in severity. No mitigating or exacerbating factors reported. Associated sxs include generalized weakness and fatigue. Patient denies any CP, SOB, fever, SOB, and all other sxs at this time. No further complaints or concerns at this time. She has been on an escalating dose of Neurontin for years and also has been on prednisone 20 mg a day recently reduced to 10 mg a day for over 10 years. She also takes other pain meds like norco but adamantly denies NSAIDS. She also a long hx of heavy menses and has had chronic anemia. She had prolong heavy menses a couple weeks ago and there has been talks about need for hysterectomy. She denies abdominal pain, N/V, Blood in stools or any other source of bleeding.

## 2025-07-22 NOTE — CONSULTS
[facility] Medicine  Consult Note    Patient Name: Delicia Moe  MRN: [id number]  Admission Date: 7/20/2025  Hospital Length of Stay: 1 days  Attending Physician: Radha Miles MD   Primary Care Provider: Baldev Venegas MD           Patient information was obtained from patient, past medical records, and ER records.     Consults  Subjective:     Principal Problem: Septic shock    Chief Complaint:   Chief Complaint   Patient presents with    Back Pain     Reports back pain from previous injury. Lower back pain. Appears drowsy and weak.         HPI:   49 y.o. female patient with a PMHx of HTN and spinal stenosis who presents to the Emergency Department for concerns of septic UTI. Pt was sent to the ED from [facility] for ICU admission. The pt initially came for lower back pain. The pain is chronic and has been evaluated by Dr. Montaño (Spine Specialist) who believes the available surgical procedure may not provide pain relief but would stabilize the spine. Symptoms are constant and moderate in severity. No mitigating or exacerbating factors reported. Associated sxs include generalized weakness and fatigue. Patient denies any CP, SOB, fever, SOB, and all other sxs at this time. No further complaints or concerns at this time. She has been on an escalating dose of Neurontin for years and also has been on prednisone 20 mg a day recently reduced to 10 mg a day for over 10 years. She also takes other pain meds like norco but adamantly denies NSAIDS. She also a long hx of heavy menses and has had chronic anemia. She had prolong heavy menses a couple weeks ago and there has been talks about need for hysterectomy. She denies abdominal pain, N/V, Blood in stools or any other source of bleeding.                  Past Medical History:   Diagnosis Date    Chronic back pain     Hypertension     Migraines     Obese     Spinal stenosis        Past Surgical History:   Procedure Laterality Date    COLPOSCOPY          Review of patient's allergies indicates:   Allergen Reactions    Codeine        No current facility-administered medications on file prior to encounter.     Current Outpatient Medications on File Prior to Encounter   Medication Sig    amlodipine (NORVASC) 10 MG tablet Take 10 mg by mouth once daily.    diphenhydrAMINE (BENADRYL) 50 MG capsule Take 50 mg by mouth every 6 (six) hours as needed for Itching.    ferrous sulfate 325 (65 FE) MG EC tablet Take 1 tablet (325 mg total) by mouth once daily.    hydrochlorothiazide (MICROZIDE) 12.5 mg capsule Take 12.5 mg by mouth once daily.    Lactobacillus acidophilus 500 million cell Tab Take 1 tablet by mouth 3 (three) times daily with meals.    naproxen (NAPROSYN) 500 MG tablet Take 1 tablet (500 mg total) by mouth 2 (two) times daily with meals.    omeprazole (PRILOSEC) 20 MG capsule Take 20 mg by mouth once daily.    ondansetron (ZOFRAN-ODT) 4 MG TbDL Take 1 tablet (4 mg total) by mouth every 6 (six) hours as needed (nausea).    tramadol (ULTRAM) 50 mg tablet Take 50 mg by mouth every 6 (six) hours as needed for Pain.    traMADoL (ULTRAM) 50 mg tablet Take 1 tablet (50 mg total) by mouth every 6 (six) hours as needed for Pain.     Family History       Problem Relation (Age of Onset)    Dementia Mother    Diabetes Father    Frontotemporal dementia Mother    Lupus Mother, Father    Vasculitis Mother, Father          Tobacco Use    Smoking status: Every Day     Current packs/day: 0.50     Types: Cigarettes    Smokeless tobacco: Never   Substance and Sexual Activity    Alcohol use: No    Drug use: No    Sexual activity: Not on file     Review of Systems   Constitutional:  Positive for activity change, appetite change and fatigue.   HENT: Negative.     Musculoskeletal:  Positive for back pain.   Psychiatric/Behavioral:  The patient is nervous/anxious.    All other systems reviewed and are negative.    Objective:     Vital Signs (Most Recent):  Temp: 98.5 °F (36.9 °C)  (07/21/25 1953)  Pulse: 78 (07/21/25 2000)  Resp: 20 (07/21/25 2104)  BP: (!) 101/55 (07/21/25 1953)  SpO2: (!) 93 % (07/21/25 1953) Vital Signs (24h Range):  Temp:  [97.8 °F (36.6 °C)-98.5 °F (36.9 °C)] 98.5 °F (36.9 °C)  Pulse:  [] 78  Resp:  [15-35] 20  SpO2:  [93 %-100 %] 93 %  BP: ()/(50-94) 101/55     Weight: 96.6 kg (213 lb)  Body mass index is 36.56 kg/m².     Physical Exam  Vitals and nursing note reviewed.   Constitutional:       General: She is not in acute distress.     Appearance: She is ill-appearing. She is not toxic-appearing or diaphoretic.   HENT:      Right Ear: External ear normal.      Left Ear: External ear normal.      Nose: Nose normal.      Mouth/Throat:      Pharynx: Oropharynx is clear.   Eyes:      Extraocular Movements: Extraocular movements intact.      Conjunctiva/sclera: Conjunctivae normal.      Pupils: Pupils are equal, round, and reactive to light.   Cardiovascular:      Rate and Rhythm: Normal rate.      Pulses: Normal pulses.   Pulmonary:      Effort: Pulmonary effort is normal.   Abdominal:      General: Bowel sounds are normal.      Palpations: Abdomen is soft.   Musculoskeletal:         General: Tenderness present.      Cervical back: Normal range of motion and neck supple.   Skin:     General: Skin is warm and dry.      Capillary Refill: Capillary refill takes 2 to 3 seconds.   Neurological:      General: No focal deficit present.      Mental Status: She is alert.      Motor: Weakness present.   Psychiatric:         Mood and Affect: Mood normal.         Behavior: Behavior normal.         Thought Content: Thought content normal.         Judgment: Judgment normal.      Significant Labs:     Results for orders placed or performed during the hospital encounter of 07/20/25   POCT glucose    Collection Time: 07/20/25  6:43 AM   Result Value Ref Range    POCT Glucose 167 (H) 70 - 110 mg/dL   Comprehensive metabolic panel    Collection Time: 07/20/25  6:55 AM   Result  Value Ref Range    Sodium 130 (L) 136 - 145 mmol/L    Potassium 4.0 3.5 - 5.1 mmol/L    Chloride 95 95 - 110 mmol/L    CO2 19 (L) 23 - 29 mmol/L    Glucose 148 (H) 70 - 110 mg/dL    BUN 33 (H) 6 - 20 mg/dL    Creatinine 3.4 (H) 0.5 - 1.4 mg/dL    Calcium 10.1 8.7 - 10.5 mg/dL    Protein Total 7.0 6.0 - 8.4 gm/dL    Albumin 2.7 (L) 3.5 - 5.2 g/dL    Bilirubin Total 0.8 0.1 - 1.0 mg/dL     40 - 150 unit/L    AST 36 11 - 45 unit/L    ALT 35 10 - 44 unit/L    Anion Gap 16 8 - 16 mmol/L    eGFR 16 (L) >60 mL/min/1.73/m2   Lactic acid, plasma #1    Collection Time: 07/20/25  6:55 AM   Result Value Ref Range    Lactic Acid Level 2.7 (H) 0.5 - 2.2 mmol/L   CBC with Differential    Collection Time: 07/20/25  6:55 AM   Result Value Ref Range    WBC 20.00 (H) 3.90 - 12.70 K/uL    RBC 3.69 (L) 4.00 - 5.40 M/uL    HGB 6.4 (L) 12.0 - 16.0 gm/dL    HCT 22.2 (L) 37.0 - 48.5 %    MCV 60 (L) 82 - 98 fL    MCH 17.3 (L) 27.0 - 31.0 pg    MCHC 28.8 (L) 32.0 - 36.0 g/dL    RDW 20.4 (H) 11.5 - 14.5 %    Platelet Count 416 150 - 450 K/uL    MPV 10.9 9.2 - 12.9 fL    Nucleated RBC 0 <=0 /100 WBC    Neut % 89.8 (H) 38 - 73 %    Lymph % 5.0 (L) 18 - 48 %    Mono % 2.4 (L) 4 - 15 %    Eos % 0.2 <=8 %    Basophil % 0.2 <=1.9 %    Imm Grans % 2.4 (H) 0.0 - 0.5 %    Neut # 17.96 (H) 1.8 - 7.7 K/uL    Lymph # 1.00 1 - 4.8 K/uL    Mono # 0.48 0.3 - 1 K/uL    Eos # 0.04 <=0.5 K/uL    Baso # 0.04 <=0.2 K/uL    Imm Grans # 0.48 (H) 0.00 - 0.04 K/uL   Blood culture x two cultures. Draw prior to antibiotics.    Collection Time: 07/20/25  6:56 AM    Specimen: Peripheral, Forearm, Right; Blood   Result Value Ref Range    GRAM STAIN Gram Negative Rods (AA)    Blood culture x two cultures. Draw prior to antibiotics.    Collection Time: 07/20/25  6:56 AM    Specimen: Peripheral, Upper Arm, Left; Blood   Result Value Ref Range    GRAM STAIN Gram Negative Rods (AA)    Rapid Organism ID by PCR (from Blood culture)    Collection Time: 07/20/25  6:56 AM     Specimen: Peripheral, Upper Arm, Left; Blood   Result Value Ref Range    Enterococcus faecalis Not Detected Not Detected    Enterococcus faecium Not Detected Not Detected    Listeria monocytogenes Not Detected Not Detected    Staphylococcus spp. Not Detected Not Detected    Staphylococcus aureus Not Detected Not Detected    Staphylococcus epidermidis Not Detected Not Detected    Staphylococcus lugdunensis Not Detected Not Detected    Streptococcus spp. Not Detected Not Detected    Streptococcus agalactiae (Group B) Not Detected Not Detected    Streptococcus pneumoniae Not Detected Not Detected    Streptococcus pyogenes (Group A) Not Detected Not Detected    Acinetobacter calcoaceticus/baumannii complex Not Detected Not Detected    Bacteroides fragilis Not Detected Not Detected    Enterobacterales See Species for ID (A) Not Detected    Enterobacter cloacae complex Not Detected Not Detected    Escherichia coli Detected (A) Not Detected    Klebsiella aerogenes Not Detected Not Detected    Klebsiella oxytoca Not Detected Not Detected    Klebsiella pneumoniae group Not Detected Not Detected    Proteus spp. Not Detected Not Detected    Salmonella spp. Not Detected Not Detected    Serratia marcescens Not Detected Not Detected    Haemophilus influenzae Not Detected Not Detected    Neisseria meningitidis Not Detected Not Detected    Pseudomonas aeruginosa Not Detected Not Detected    Stenotrophomonas maltophilia Not Detected Not Detected    Candida albicans Not Detected Not Detected    Candida auris Not Detected Not Detected    Candida glabrata Not Detected Not Detected    Candida krusei Not Detected Not Detected    Candida parapsilosis Not Detected Not Detected    Candida tropicalis Not Detected Not Detected    Cryptococcus neoformans/gattii Not Detected Not Detected    CTX-M (ESBL ) Not Detected Not Detected, N/A    IMP (Cabapenemase ) Not Detected Not Detected, N/A    KPC resistance gene (Carbapenemase  ) Not Detected Not Detected, N/A    mcr-1 Not Detected Not Detected, N/A    mecA ID N/A Not Detected, N/A    mecA/C and MREJ (MRSA) gene N/A Not Detected, N/A    NDM (Carbapenemase ) Not Detected Not Detected, N/A    OXA-48-like (Carbapenemase ) Not Detected Not Detected, N/A    Karly/B (VRE gene) N/A Not Detected, N/A    VIM (Carbapenemase ) Not Detected Not Detected, N/A   EKG 12-lead    Collection Time: 07/20/25  7:01 AM   Result Value Ref Range    QRS Duration 80 ms    OHS QTC Calculation 423 ms   Brain natriuretic peptide    Collection Time: 07/20/25  7:21 AM   Result Value Ref Range    BNP 35 0 - 99 pg/mL   Urine culture    Collection Time: 07/20/25  7:41 AM    Specimen: Urine, Clean Catch   Result Value Ref Range    Urine Culture 50,000 - 99,999 cfu/ml Gram-negative Rods (A)    Urinalysis, Reflex to Urine Culture Urine, Clean Catch    Collection Time: 07/20/25  7:41 AM    Specimen: Urine, Clean Catch   Result Value Ref Range    Color, UA Yellow Straw, Trisha, Yellow, Light-Orange    Appearance, UA Clear Clear    pH, UA 6.0 5.0 - 8.0    Spec Grav UA 1.010 1.005 - 1.030    Protein, UA Trace (A) Negative    Glucose, UA Negative Negative    Ketones, UA Negative Negative    Bilirubin, UA 3+ (A) Negative    Blood, UA Negative Negative    Nitrites, UA Positive (A) Negative    Urobilinogen, UA Negative <2.0 EU/dL    Leukocyte Esterase, UA 2+ (A) Negative   GREY TOP URINE HOLD    Collection Time: 07/20/25  7:41 AM   Result Value Ref Range    Extra Tube Hold for add-ons.    Urinalysis Microscopic    Collection Time: 07/20/25  7:41 AM   Result Value Ref Range    RBC, UA 1 0 - 4 /HPF    WBC, UA 12 (H) 0 - 5 /HPF    Bacteria, UA Moderate (A) None, Rare, Occasional /HPF    Microscopic Comment     Cortisol    Collection Time: 07/20/25  7:48 AM   Result Value Ref Range    Cortisol 15.10 ug/dL   ISTAT PROCEDURE    Collection Time: 07/20/25  8:22 AM   Result Value Ref Range    POC PH 7.288 (LL)  7.35 - 7.45    POC PCO2 41.0 35 - 45 mmHg    POC PO2 32 (L) 40 - 60 mmHg    POC HCO3 19.6 (L) 24 - 28 mmol/L    POC BE -7 (L) -2 to 2 mmol/L    POC SATURATED O2 55 95 - 100 %    Sample VENOUS     Site RR     Allens Test Pass     DelSys Room Air     Mode SPONT    Hepatitis C Antibody    Collection Time: 07/20/25  9:02 AM   Result Value Ref Range    Hep C Ab Interp Negative Negative   HIV 1/2 Ag/Ab (4th Gen)    Collection Time: 07/20/25  9:02 AM   Result Value Ref Range    HIV 1/2 Ag/Ab Negative Negative   Lactic acid, plasma #2    Collection Time: 07/20/25  9:02 AM   Result Value Ref Range    Lactic Acid Level 1.1 0.5 - 2.2 mmol/L   Procalcitonin    Collection Time: 07/20/25  9:02 AM   Result Value Ref Range    Procalcitonin 22.96 (H) <0.25 ng/mL   Lactic Acid, Plasma    Collection Time: 07/20/25 10:07 AM   Result Value Ref Range    Lactic Acid Level 1.7 0.5 - 2.2 mmol/L   Light Green Top Hold    Collection Time: 07/20/25 10:43 AM   Result Value Ref Range    Extra Tube Hold for add-ons.    Lavender Top Hold    Collection Time: 07/20/25 10:43 AM   Result Value Ref Range    Extra Tube Hold for add-ons.    Gold Top Hold    Collection Time: 07/20/25 10:43 AM   Result Value Ref Range    Extra Tube Hold for add-ons.    Type & Screen    Collection Time: 07/20/25 10:45 AM   Result Value Ref Range    Specimen Outdate 07/23/2025 23:59     Group & Rh O POS     Indirect Esau NEG    Prepare RBC 1 Unit    Collection Time: 07/20/25 10:45 AM   Result Value Ref Range    UNIT NUMBER [id number]     UNIT ABO/RH O POS     DISPENSE STATUS Transfused     Unit Expiration [id number]     Product Code M4200F29     Unit Blood Type Code 5100     CROSSMATCH INTERPRETATION Compatible    Prepare RBC 2 Units; H/H    Collection Time: 07/20/25 10:45 AM   Result Value Ref Range    UNIT NUMBER [id number]     UNIT ABO/RH O POS     DISPENSE STATUS Transfused     Unit Expiration [id number]     Product Code F5998N34     Unit Blood Type Code 5100      CROSSMATCH INTERPRETATION Compatible     UNIT NUMBER [id number]     UNIT ABO/RH O POS     DISPENSE STATUS Transfused     Unit Expiration [id number]     Product Code H9870T38     Unit Blood Type Code 5100     CROSSMATCH INTERPRETATION Compatible    Prepare RBC 2 Units; Anemia    Collection Time: 07/20/25 10:45 AM   Result Value Ref Range    UNIT NUMBER [id number]     UNIT ABO/RH O POS     DISPENSE STATUS Selected     Unit Expiration [id number]     Product Code V3438U81     Unit Blood Type Code 5100     CROSSMATCH INTERPRETATION Compatible     UNIT NUMBER [id number]     UNIT ABO/RH O POS     DISPENSE STATUS Selected     Unit Expiration [id number]     Product Code K2017K75     Unit Blood Type Code 5100     CROSSMATCH INTERPRETATION Compatible    ABORH RETYPE    Collection Time: 07/20/25 11:53 AM   Result Value Ref Range    ABORH Retype O POS    Troponin I    Collection Time: 07/20/25 11:54 AM   Result Value Ref Range    Troponin-I 0.010 <=0.026 ng/mL   CBC with Differential    Collection Time: 07/20/25  4:40 PM   Result Value Ref Range    WBC 18.45 (H) 3.90 - 12.70 K/uL    RBC 3.50 (L) 4.00 - 5.40 M/uL    HGB 6.4 (L) 12.0 - 16.0 gm/dL    HCT 22.2 (L) 37.0 - 48.5 %    MCV 63 (L) 82 - 98 fL    MCH 18.3 (L) 27.0 - 31.0 pg    MCHC 28.8 (L) 32.0 - 36.0 g/dL    RDW 22.1 (H) 11.5 - 14.5 %    Platelet Count 347 150 - 450 K/uL    MPV 9.8 9.2 - 12.9 fL    Nucleated RBC 0 <=0 /100 WBC    Neut % 92.9 (H) 38 - 73 %    Lymph % 1.9 (L) 18 - 48 %    Mono % 3.1 (L) 4 - 15 %    Eos % 0.0 <=8 %    Basophil % 0.1 <=1.9 %    Imm Grans % 2.0 (H) 0.0 - 0.5 %    Neut # 17.14 (H) 1.8 - 7.7 K/uL    Lymph # 0.35 (L) 1 - 4.8 K/uL    Mono # 0.57 0.3 - 1 K/uL    Eos # 0.00 <=0.5 K/uL    Baso # 0.02 <=0.2 K/uL    Imm Grans # 0.37 (H) 0.00 - 0.04 K/uL   Morphology    Collection Time: 07/20/25  4:40 PM    Specimen: Blood   Result Value Ref Range    Platelet Estimate Appears Normal      Anisocytosis Moderate     Poik Slight      Hypochromia Occasional     Ovalocytes Occasional     Tear drop cell Occasional    Comprehensive Metabolic Panel    Collection Time: 07/21/25  4:54 AM   Result Value Ref Range    Sodium 136 136 - 145 mmol/L    Potassium 4.1 3.5 - 5.1 mmol/L    Chloride 107 95 - 110 mmol/L    CO2 21 (L) 23 - 29 mmol/L    Glucose 145 (H) 70 - 110 mg/dL    BUN 29 (H) 6 - 20 mg/dL    Creatinine 1.7 (H) 0.5 - 1.4 mg/dL    Calcium 9.3 8.7 - 10.5 mg/dL    Protein Total 6.0 6.0 - 8.4 gm/dL    Albumin 2.2 (L) 3.5 - 5.2 g/dL    Bilirubin Total 0.5 0.1 - 1.0 mg/dL     40 - 150 unit/L    AST 20 11 - 45 unit/L    ALT 28 10 - 44 unit/L    Anion Gap 8 8 - 16 mmol/L    eGFR 37 (L) >60 mL/min/1.73/m2   CBC with Differential    Collection Time: 07/21/25  4:54 AM   Result Value Ref Range    WBC 15.34 (H) 3.90 - 12.70 K/uL    RBC 3.92 (L) 4.00 - 5.40 M/uL    HGB 8.1 (L) 12.0 - 16.0 gm/dL    HCT 25.9 (L) 37.0 - 48.5 %    MCV 66 (L) 82 - 98 fL    MCH 20.7 (L) 27.0 - 31.0 pg    MCHC 31.3 (L) 32.0 - 36.0 g/dL    RDW 24.9 (H) 11.5 - 14.5 %    Platelet Count 330 150 - 450 K/uL    MPV 10.3 9.2 - 12.9 fL    Nucleated RBC 0 <=0 /100 WBC    Neut % 90.1 (H) 38 - 73 %    Lymph % 3.7 (L) 18 - 48 %    Mono % 5.0 4 - 15 %    Eos % 0.0 <=8 %    Basophil % 0.1 <=1.9 %    Imm Grans % 1.1 (H) 0.0 - 0.5 %    Neut # 13.83 (H) 1.8 - 7.7 K/uL    Lymph # 0.56 (L) 1 - 4.8 K/uL    Mono # 0.76 0.3 - 1 K/uL    Eos # 0.00 <=0.5 K/uL    Baso # 0.02 <=0.2 K/uL    Imm Grans # 0.17 (H) 0.00 - 0.04 K/uL        All pertinent labs within the past 24 hours have been reviewed.  Blood Culture:   Urine Culture:   Recent Labs   Lab 07/20/25  0741   LABURIN 50,000 - 99,999 cfu/ml Gram-negative Rods*     Imaging Results              X-Ray Chest AP Portable (Final result)  Result time 07/20/25 07:22:56      Final result by Jorje Ahmadi MD (07/20/25 07:22:56)                   Impression:     As above.    Finalized on: 7/20/2025 7:22 AM By:  Jorje Ahmadi MD  [facility]# [id number]       2025-07-20 07:25:06.149     [facility]               Narrative:    EXAM: XR CHEST AP PORTABLE    CLINICAL HISTORY: Sepsis.    FINDINGS:  Heart upper limit of normal size.  Lungs are clear.  No pleural fluid or pneumothorax.                                         Significant Imaging: I have reviewed all pertinent imaging results/findings within the past 24 hours.  Assessment/Plan:     * Septic shock  This patient has shock. The type of shock is distributive due to sepsis. The patient has the following evidence of shock: persistent hypotension. The patient will be admitted to an intensive care unit, they will be treated with IVF, Solucortef  May need Pressors    BP improved w Solucortef- continued  .    FERMIN (acute kidney injury)  FERMIN is likely due to pre-renal azotemia due to dehydration. Baseline creatinine is unknown. Most recent creatinine and eGFR are listed below.  Recent Labs     07/20/25  0655 07/21/25  0454   CREATININE 3.4* 1.7*   EGFRNORACEVR 16* 37*      Plan  - FERMIN is improving  - Avoid nephrotoxins and renally dose meds for GFR listed above  - Monitor urine output, serial BMP, and adjust therapy as needed  - continue IVF    Severe anemia  Anemia is likely due to chronic blood loss and Iron deficiency. Most recent hemoglobin and hematocrit are listed below.  Recent Labs     07/20/25  0655 07/20/25  1640 07/21/25  0454   HGB 6.4* 6.4* 8.1*   HCT 22.2* 22.2* 25.9*     Plan  - Monitor serial CBC: Daily  - Transfuse PRBC if patient becomes hemodynamically unstable, symptomatic or H/H drops below 7/21.  - Patient has received 2 units of PRBCs on 7/21  - Patient's anemia is currently improving  -     Chronic pain syndrome  Cont Norco and gabapentin as BP permits      Adrenal insufficiency  Sec to prolonged use of steroids for her spinal stenosis  Good response to Solucortef  May switch to Prednisone or or Hydrocortisone in am      Acute pyelonephritis  Cont Rocephin- clinically better        VTE Risk Mitigation  (From admission, onward)           Ordered     Place sequential compression device  Until discontinued         07/21/25 8440                        Thank you for your consult. I will follow-up with patient. Please contact us if you have any additional questions.    Radha Miles MD  Department of Hospital Medicine   [facility] - Med Surg

## 2025-07-22 NOTE — PLAN OF CARE
Discussed poc with pt, pt verbalized understanding    Purposeful rounding every 2hours    VS monitored for need of PRN interventions   Cardiac monitoring in use, pt is SB to NSR, tele monitor # [id number]  Fall precautions in place, remains injury free  Pt denies c/o N/V  Pain under control with PRN meds    Abx given as prescribed  Bed locked at lowest position  Call light within reach    Chart check complete  Will cont with POC

## 2025-07-22 NOTE — ASSESSMENT & PLAN NOTE
Sec to prolonged use of steroids for her spinal stenosis  Good response to Solucortef  May switch to Prednisone or or Hydrocortisone in am

## 2025-07-22 NOTE — NURSING
AAOx4  OOB to chair  Medicated for pain  VSS  Safety measures maintained, call bell within reach  Purposeful rounding every 2 hours.

## 2025-07-22 NOTE — HOSPITAL COURSE
49 y.o. female patient with a PMHx of HTN and spinal stenosis who presents to the Emergency Department for concerns of septic UTI. Pt was sent to the ED from [facility] for ICU admission. The pt initially came for lower back pain. The pain is chronic and has been evaluated by Dr. Montaño (Spine Specialist) who believes the available surgical procedure may not provide pain relief but would stabilize the spine. Symptoms are constant and moderate in severity. No mitigating or exacerbating factors reported. Associated sxs include generalized weakness and fatigue. Patient denies any CP, SOB, fever, SOB, and all other sxs at this time. No further complaints or concerns at this time. She has been on an escalating dose of Neurontin for years and also has been on prednisone 20 mg a day recently reduced to 10 mg a day for over 10 years. She also takes other pain meds like norco but adamantly denies NSAIDS. She also a long hx of heavy menses and has had chronic anemia. She had prolong heavy menses a couple weeks ago and there has been talks about need for hysterectomy. She denies abdominal pain, N/V, Blood in stools or any other source of bleeding.     Hospital/ICU Course:  7/21 No hemodynamic or resp issues.    Hosp Med: 7/21/25: pt seen and examined in the room. C/o back pain but otherwise comfortable. Admitted to ICU yesterday w Septic Shock from UTI. She has been steroid dependent for a long time hence started on stress doses of steroids with stabilization of her BP. She remained hemodynamically stable, oxygenating well, transferred out of ICU to Hosp Med today. Later her Blood Cx came back positive for GNR.     7/22- looks and feels better, still c/o back pain. BP stable, getting IV Solucortef. All Cx growing E coli which is pan Sensitive- hence Abx switched to Rocephin. Also Steroid dose reduced and may be switched to PO hydrocortisone tomorrow. WBC elevated further to 21 K, likely sec to the steroids.     7/23- looks  and feels better but c/o LBP and wants to go home as she can't sleep here. She sleeps in a recliner at home and she does not like the recliner in her room, can't sleep on it. Otherwise doing better, Blood Cx repeated. WBC coming down since lowering the Solucortef and it was changed to oral Prednisone. E Coli is pan sensitive and abx changed to oral Levaquin after d/w ID- Dr. Matt. Pt persuaded to stay another day or so but she might leave AMA. Nursing looking for another recliner in the hosp.

## 2025-07-22 NOTE — ASSESSMENT & PLAN NOTE
Anemia is likely due to chronic blood loss and Iron deficiency. Most recent hemoglobin and hematocrit are listed below.  Recent Labs     07/20/25  0655 07/20/25  1640 07/21/25  0454   HGB 6.4* 6.4* 8.1*   HCT 22.2* 22.2* 25.9*     Plan  - Monitor serial CBC: Daily  - Transfuse PRBC if patient becomes hemodynamically unstable, symptomatic or H/H drops below 7/21.  - Patient has received 2 units of PRBCs on 7/21  - Patient's anemia is currently improving  -

## 2025-07-22 NOTE — ASSESSMENT & PLAN NOTE
This patient has shock. The type of shock is distributive due to sepsis. The patient has the following evidence of shock: persistent hypotension. The patient will be admitted to an intensive care unit, they will be treated with IVF, Solucortef  May need Pressors    BP improved w Solucortef- continued  .

## 2025-07-22 NOTE — SUBJECTIVE & OBJECTIVE
Past Medical History:   Diagnosis Date    Chronic back pain     Hypertension     Migraines     Obese     Spinal stenosis        Past Surgical History:   Procedure Laterality Date    COLPOSCOPY         Review of patient's allergies indicates:   Allergen Reactions    Codeine        No current facility-administered medications on file prior to encounter.     Current Outpatient Medications on File Prior to Encounter   Medication Sig    amlodipine (NORVASC) 10 MG tablet Take 10 mg by mouth once daily.    diphenhydrAMINE (BENADRYL) 50 MG capsule Take 50 mg by mouth every 6 (six) hours as needed for Itching.    ferrous sulfate 325 (65 FE) MG EC tablet Take 1 tablet (325 mg total) by mouth once daily.    hydrochlorothiazide (MICROZIDE) 12.5 mg capsule Take 12.5 mg by mouth once daily.    Lactobacillus acidophilus 500 million cell Tab Take 1 tablet by mouth 3 (three) times daily with meals.    naproxen (NAPROSYN) 500 MG tablet Take 1 tablet (500 mg total) by mouth 2 (two) times daily with meals.    omeprazole (PRILOSEC) 20 MG capsule Take 20 mg by mouth once daily.    ondansetron (ZOFRAN-ODT) 4 MG TbDL Take 1 tablet (4 mg total) by mouth every 6 (six) hours as needed (nausea).    tramadol (ULTRAM) 50 mg tablet Take 50 mg by mouth every 6 (six) hours as needed for Pain.    traMADoL (ULTRAM) 50 mg tablet Take 1 tablet (50 mg total) by mouth every 6 (six) hours as needed for Pain.     Family History       Problem Relation (Age of Onset)    Dementia Mother    Diabetes Father    Frontotemporal dementia Mother    Lupus Mother, Father    Vasculitis Mother, Father          Tobacco Use    Smoking status: Every Day     Current packs/day: 0.50     Types: Cigarettes    Smokeless tobacco: Never   Substance and Sexual Activity    Alcohol use: No    Drug use: No    Sexual activity: Not on file     Review of Systems   Constitutional:  Positive for activity change, appetite change and fatigue.   HENT: Negative.     Musculoskeletal:  Positive  for back pain.   Psychiatric/Behavioral:  The patient is nervous/anxious.    All other systems reviewed and are negative.    Objective:     Vital Signs (Most Recent):  Temp: 98.5 °F (36.9 °C) (07/21/25 1953)  Pulse: 78 (07/21/25 2000)  Resp: 20 (07/21/25 2104)  BP: (!) 101/55 (07/21/25 1953)  SpO2: (!) 93 % (07/21/25 1953) Vital Signs (24h Range):  Temp:  [97.8 °F (36.6 °C)-98.5 °F (36.9 °C)] 98.5 °F (36.9 °C)  Pulse:  [] 78  Resp:  [15-35] 20  SpO2:  [93 %-100 %] 93 %  BP: ()/(50-94) 101/55     Weight: 96.6 kg (213 lb)  Body mass index is 36.56 kg/m².     Physical Exam  Vitals and nursing note reviewed.   Constitutional:       General: She is not in acute distress.     Appearance: She is ill-appearing. She is not toxic-appearing or diaphoretic.   HENT:      Right Ear: External ear normal.      Left Ear: External ear normal.      Nose: Nose normal.      Mouth/Throat:      Pharynx: Oropharynx is clear.   Eyes:      Extraocular Movements: Extraocular movements intact.      Conjunctiva/sclera: Conjunctivae normal.      Pupils: Pupils are equal, round, and reactive to light.   Cardiovascular:      Rate and Rhythm: Normal rate.      Pulses: Normal pulses.   Pulmonary:      Effort: Pulmonary effort is normal.   Abdominal:      General: Bowel sounds are normal.      Palpations: Abdomen is soft.   Musculoskeletal:         General: Tenderness present.      Cervical back: Normal range of motion and neck supple.   Skin:     General: Skin is warm and dry.      Capillary Refill: Capillary refill takes 2 to 3 seconds.   Neurological:      General: No focal deficit present.      Mental Status: She is alert.      Motor: Weakness present.   Psychiatric:         Mood and Affect: Mood normal.         Behavior: Behavior normal.         Thought Content: Thought content normal.         Judgment: Judgment normal.      Significant Labs:     Results for orders placed or performed during the hospital encounter of 07/20/25   POCT  glucose    Collection Time: 07/20/25  6:43 AM   Result Value Ref Range    POCT Glucose 167 (H) 70 - 110 mg/dL   Comprehensive metabolic panel    Collection Time: 07/20/25  6:55 AM   Result Value Ref Range    Sodium 130 (L) 136 - 145 mmol/L    Potassium 4.0 3.5 - 5.1 mmol/L    Chloride 95 95 - 110 mmol/L    CO2 19 (L) 23 - 29 mmol/L    Glucose 148 (H) 70 - 110 mg/dL    BUN 33 (H) 6 - 20 mg/dL    Creatinine 3.4 (H) 0.5 - 1.4 mg/dL    Calcium 10.1 8.7 - 10.5 mg/dL    Protein Total 7.0 6.0 - 8.4 gm/dL    Albumin 2.7 (L) 3.5 - 5.2 g/dL    Bilirubin Total 0.8 0.1 - 1.0 mg/dL     40 - 150 unit/L    AST 36 11 - 45 unit/L    ALT 35 10 - 44 unit/L    Anion Gap 16 8 - 16 mmol/L    eGFR 16 (L) >60 mL/min/1.73/m2   Lactic acid, plasma #1    Collection Time: 07/20/25  6:55 AM   Result Value Ref Range    Lactic Acid Level 2.7 (H) 0.5 - 2.2 mmol/L   CBC with Differential    Collection Time: 07/20/25  6:55 AM   Result Value Ref Range    WBC 20.00 (H) 3.90 - 12.70 K/uL    RBC 3.69 (L) 4.00 - 5.40 M/uL    HGB 6.4 (L) 12.0 - 16.0 gm/dL    HCT 22.2 (L) 37.0 - 48.5 %    MCV 60 (L) 82 - 98 fL    MCH 17.3 (L) 27.0 - 31.0 pg    MCHC 28.8 (L) 32.0 - 36.0 g/dL    RDW 20.4 (H) 11.5 - 14.5 %    Platelet Count 416 150 - 450 K/uL    MPV 10.9 9.2 - 12.9 fL    Nucleated RBC 0 <=0 /100 WBC    Neut % 89.8 (H) 38 - 73 %    Lymph % 5.0 (L) 18 - 48 %    Mono % 2.4 (L) 4 - 15 %    Eos % 0.2 <=8 %    Basophil % 0.2 <=1.9 %    Imm Grans % 2.4 (H) 0.0 - 0.5 %    Neut # 17.96 (H) 1.8 - 7.7 K/uL    Lymph # 1.00 1 - 4.8 K/uL    Mono # 0.48 0.3 - 1 K/uL    Eos # 0.04 <=0.5 K/uL    Baso # 0.04 <=0.2 K/uL    Imm Grans # 0.48 (H) 0.00 - 0.04 K/uL   Blood culture x two cultures. Draw prior to antibiotics.    Collection Time: 07/20/25  6:56 AM    Specimen: Peripheral, Forearm, Right; Blood   Result Value Ref Range    GRAM STAIN Gram Negative Rods (AA)    Blood culture x two cultures. Draw prior to antibiotics.    Collection Time: 07/20/25  6:56 AM     Specimen: Peripheral, Upper Arm, Left; Blood   Result Value Ref Range    GRAM STAIN Gram Negative Rods (AA)    Rapid Organism ID by PCR (from Blood culture)    Collection Time: 07/20/25  6:56 AM    Specimen: Peripheral, Upper Arm, Left; Blood   Result Value Ref Range    Enterococcus faecalis Not Detected Not Detected    Enterococcus faecium Not Detected Not Detected    Listeria monocytogenes Not Detected Not Detected    Staphylococcus spp. Not Detected Not Detected    Staphylococcus aureus Not Detected Not Detected    Staphylococcus epidermidis Not Detected Not Detected    Staphylococcus lugdunensis Not Detected Not Detected    Streptococcus spp. Not Detected Not Detected    Streptococcus agalactiae (Group B) Not Detected Not Detected    Streptococcus pneumoniae Not Detected Not Detected    Streptococcus pyogenes (Group A) Not Detected Not Detected    Acinetobacter calcoaceticus/baumannii complex Not Detected Not Detected    Bacteroides fragilis Not Detected Not Detected    Enterobacterales See Species for ID (A) Not Detected    Enterobacter cloacae complex Not Detected Not Detected    Escherichia coli Detected (A) Not Detected    Klebsiella aerogenes Not Detected Not Detected    Klebsiella oxytoca Not Detected Not Detected    Klebsiella pneumoniae group Not Detected Not Detected    Proteus spp. Not Detected Not Detected    Salmonella spp. Not Detected Not Detected    Serratia marcescens Not Detected Not Detected    Haemophilus influenzae Not Detected Not Detected    Neisseria meningitidis Not Detected Not Detected    Pseudomonas aeruginosa Not Detected Not Detected    Stenotrophomonas maltophilia Not Detected Not Detected    Candida albicans Not Detected Not Detected    Candida auris Not Detected Not Detected    Candida glabrata Not Detected Not Detected    Candida krusei Not Detected Not Detected    Candida parapsilosis Not Detected Not Detected    Candida tropicalis Not Detected Not Detected    Cryptococcus  neoformans/gattii Not Detected Not Detected    CTX-M (ESBL ) Not Detected Not Detected, N/A    IMP (Cabapenemase ) Not Detected Not Detected, N/A    KPC resistance gene (Carbapenemase ) Not Detected Not Detected, N/A    mcr-1 Not Detected Not Detected, N/A    mecA ID N/A Not Detected, N/A    mecA/C and MREJ (MRSA) gene N/A Not Detected, N/A    NDM (Carbapenemase ) Not Detected Not Detected, N/A    OXA-48-like (Carbapenemase ) Not Detected Not Detected, N/A    Karly/B (VRE gene) N/A Not Detected, N/A    VIM (Carbapenemase ) Not Detected Not Detected, N/A   EKG 12-lead    Collection Time: 07/20/25  7:01 AM   Result Value Ref Range    QRS Duration 80 ms    OHS QTC Calculation 423 ms   Brain natriuretic peptide    Collection Time: 07/20/25  7:21 AM   Result Value Ref Range    BNP 35 0 - 99 pg/mL   Urine culture    Collection Time: 07/20/25  7:41 AM    Specimen: Urine, Clean Catch   Result Value Ref Range    Urine Culture 50,000 - 99,999 cfu/ml Gram-negative Rods (A)    Urinalysis, Reflex to Urine Culture Urine, Clean Catch    Collection Time: 07/20/25  7:41 AM    Specimen: Urine, Clean Catch   Result Value Ref Range    Color, UA Yellow Straw, Trisha, Yellow, Light-Orange    Appearance, UA Clear Clear    pH, UA 6.0 5.0 - 8.0    Spec Grav UA 1.010 1.005 - 1.030    Protein, UA Trace (A) Negative    Glucose, UA Negative Negative    Ketones, UA Negative Negative    Bilirubin, UA 3+ (A) Negative    Blood, UA Negative Negative    Nitrites, UA Positive (A) Negative    Urobilinogen, UA Negative <2.0 EU/dL    Leukocyte Esterase, UA 2+ (A) Negative   GREY TOP URINE HOLD    Collection Time: 07/20/25  7:41 AM   Result Value Ref Range    Extra Tube Hold for add-ons.    Urinalysis Microscopic    Collection Time: 07/20/25  7:41 AM   Result Value Ref Range    RBC, UA 1 0 - 4 /HPF    WBC, UA 12 (H) 0 - 5 /HPF    Bacteria, UA Moderate (A) None, Rare, Occasional /HPF    Microscopic Comment      Cortisol    Collection Time: 07/20/25  7:48 AM   Result Value Ref Range    Cortisol 15.10 ug/dL   ISTAT PROCEDURE    Collection Time: 07/20/25  8:22 AM   Result Value Ref Range    POC PH 7.288 (LL) 7.35 - 7.45    POC PCO2 41.0 35 - 45 mmHg    POC PO2 32 (L) 40 - 60 mmHg    POC HCO3 19.6 (L) 24 - 28 mmol/L    POC BE -7 (L) -2 to 2 mmol/L    POC SATURATED O2 55 95 - 100 %    Sample VENOUS     Site RR     Allens Test Pass     DelSys Room Air     Mode SPONT    Hepatitis C Antibody    Collection Time: 07/20/25  9:02 AM   Result Value Ref Range    Hep C Ab Interp Negative Negative   HIV 1/2 Ag/Ab (4th Gen)    Collection Time: 07/20/25  9:02 AM   Result Value Ref Range    HIV 1/2 Ag/Ab Negative Negative   Lactic acid, plasma #2    Collection Time: 07/20/25  9:02 AM   Result Value Ref Range    Lactic Acid Level 1.1 0.5 - 2.2 mmol/L   Procalcitonin    Collection Time: 07/20/25  9:02 AM   Result Value Ref Range    Procalcitonin 22.96 (H) <0.25 ng/mL   Lactic Acid, Plasma    Collection Time: 07/20/25 10:07 AM   Result Value Ref Range    Lactic Acid Level 1.7 0.5 - 2.2 mmol/L   Light Green Top Hold    Collection Time: 07/20/25 10:43 AM   Result Value Ref Range    Extra Tube Hold for add-ons.    Lavender Top Hold    Collection Time: 07/20/25 10:43 AM   Result Value Ref Range    Extra Tube Hold for add-ons.    Gold Top Hold    Collection Time: 07/20/25 10:43 AM   Result Value Ref Range    Extra Tube Hold for add-ons.    Type & Screen    Collection Time: 07/20/25 10:45 AM   Result Value Ref Range    Specimen Outdate 07/23/2025 23:59     Group & Rh O POS     Indirect Esau NEG    Prepare RBC 1 Unit    Collection Time: 07/20/25 10:45 AM   Result Value Ref Range    UNIT NUMBER [id number]     UNIT ABO/RH O POS     DISPENSE STATUS Transfused     Unit Expiration [id number]     Product Code U8687E96     Unit Blood Type Code 5100     CROSSMATCH INTERPRETATION Compatible    Prepare RBC 2 Units; H/H    Collection Time: 07/20/25  10:45 AM   Result Value Ref Range    UNIT NUMBER [id number]     UNIT ABO/RH O POS     DISPENSE STATUS Transfused     Unit Expiration [id number]     Product Code I5095O78     Unit Blood Type Code 5100     CROSSMATCH INTERPRETATION Compatible     UNIT NUMBER [id number]     UNIT ABO/RH O POS     DISPENSE STATUS Transfused     Unit Expiration [id number]     Product Code O2995L55     Unit Blood Type Code 5100     CROSSMATCH INTERPRETATION Compatible    Prepare RBC 2 Units; Anemia    Collection Time: 07/20/25 10:45 AM   Result Value Ref Range    UNIT NUMBER [id number]     UNIT ABO/RH O POS     DISPENSE STATUS Selected     Unit Expiration [id number]     Product Code J9087C50     Unit Blood Type Code 5100     CROSSMATCH INTERPRETATION Compatible     UNIT NUMBER [id number]     UNIT ABO/RH O POS     DISPENSE STATUS Selected     Unit Expiration [id number]     Product Code V4008V81     Unit Blood Type Code 5100     CROSSMATCH INTERPRETATION Compatible    ABORH RETYPE    Collection Time: 07/20/25 11:53 AM   Result Value Ref Range    ABORH Retype O POS    Troponin I    Collection Time: 07/20/25 11:54 AM   Result Value Ref Range    Troponin-I 0.010 <=0.026 ng/mL   CBC with Differential    Collection Time: 07/20/25  4:40 PM   Result Value Ref Range    WBC 18.45 (H) 3.90 - 12.70 K/uL    RBC 3.50 (L) 4.00 - 5.40 M/uL    HGB 6.4 (L) 12.0 - 16.0 gm/dL    HCT 22.2 (L) 37.0 - 48.5 %    MCV 63 (L) 82 - 98 fL    MCH 18.3 (L) 27.0 - 31.0 pg    MCHC 28.8 (L) 32.0 - 36.0 g/dL    RDW 22.1 (H) 11.5 - 14.5 %    Platelet Count 347 150 - 450 K/uL    MPV 9.8 9.2 - 12.9 fL    Nucleated RBC 0 <=0 /100 WBC    Neut % 92.9 (H) 38 - 73 %    Lymph % 1.9 (L) 18 - 48 %    Mono % 3.1 (L) 4 - 15 %    Eos % 0.0 <=8 %    Basophil % 0.1 <=1.9 %    Imm Grans % 2.0 (H) 0.0 - 0.5 %    Neut # 17.14 (H) 1.8 - 7.7 K/uL    Lymph # 0.35 (L) 1 - 4.8 K/uL    Mono # 0.57 0.3 - 1 K/uL    Eos # 0.00 <=0.5 K/uL    Baso # 0.02 <=0.2 K/uL    Imm Grans # 0.37  (H) 0.00 - 0.04 K/uL   Morphology    Collection Time: 07/20/25  4:40 PM    Specimen: Blood   Result Value Ref Range    Platelet Estimate Appears Normal      Anisocytosis Moderate     Poik Slight     Hypochromia Occasional     Ovalocytes Occasional     Tear drop cell Occasional    Comprehensive Metabolic Panel    Collection Time: 07/21/25  4:54 AM   Result Value Ref Range    Sodium 136 136 - 145 mmol/L    Potassium 4.1 3.5 - 5.1 mmol/L    Chloride 107 95 - 110 mmol/L    CO2 21 (L) 23 - 29 mmol/L    Glucose 145 (H) 70 - 110 mg/dL    BUN 29 (H) 6 - 20 mg/dL    Creatinine 1.7 (H) 0.5 - 1.4 mg/dL    Calcium 9.3 8.7 - 10.5 mg/dL    Protein Total 6.0 6.0 - 8.4 gm/dL    Albumin 2.2 (L) 3.5 - 5.2 g/dL    Bilirubin Total 0.5 0.1 - 1.0 mg/dL     40 - 150 unit/L    AST 20 11 - 45 unit/L    ALT 28 10 - 44 unit/L    Anion Gap 8 8 - 16 mmol/L    eGFR 37 (L) >60 mL/min/1.73/m2   CBC with Differential    Collection Time: 07/21/25  4:54 AM   Result Value Ref Range    WBC 15.34 (H) 3.90 - 12.70 K/uL    RBC 3.92 (L) 4.00 - 5.40 M/uL    HGB 8.1 (L) 12.0 - 16.0 gm/dL    HCT 25.9 (L) 37.0 - 48.5 %    MCV 66 (L) 82 - 98 fL    MCH 20.7 (L) 27.0 - 31.0 pg    MCHC 31.3 (L) 32.0 - 36.0 g/dL    RDW 24.9 (H) 11.5 - 14.5 %    Platelet Count 330 150 - 450 K/uL    MPV 10.3 9.2 - 12.9 fL    Nucleated RBC 0 <=0 /100 WBC    Neut % 90.1 (H) 38 - 73 %    Lymph % 3.7 (L) 18 - 48 %    Mono % 5.0 4 - 15 %    Eos % 0.0 <=8 %    Basophil % 0.1 <=1.9 %    Imm Grans % 1.1 (H) 0.0 - 0.5 %    Neut # 13.83 (H) 1.8 - 7.7 K/uL    Lymph # 0.56 (L) 1 - 4.8 K/uL    Mono # 0.76 0.3 - 1 K/uL    Eos # 0.00 <=0.5 K/uL    Baso # 0.02 <=0.2 K/uL    Imm Grans # 0.17 (H) 0.00 - 0.04 K/uL        All pertinent labs within the past 24 hours have been reviewed.  Blood Culture:   Urine Culture:   Recent Labs   Lab 07/20/25  0741   LABURIN 50,000 - 99,999 cfu/ml Gram-negative Rods*     Imaging Results              X-Ray Chest AP Portable (Final result)  Result time 07/20/25  07:22:56      Final result by Jorje Ahmadi MD (07/20/25 07:22:56)                   Impression:     As above.    Finalized on: 7/20/2025 7:22 AM By:  Jorje Ahmadi MD  [facility]# [id number]      2025-07-20 07:25:06.149     [facility]               Narrative:    EXAM: XR CHEST AP PORTABLE    CLINICAL HISTORY: Sepsis.    FINDINGS:  Heart upper limit of normal size.  Lungs are clear.  No pleural fluid or pneumothorax.                                         Significant Imaging: I have reviewed all pertinent imaging results/findings within the past 24 hours.

## 2025-07-23 VITALS
OXYGEN SATURATION: 97 % | DIASTOLIC BLOOD PRESSURE: 66 MMHG | HEIGHT: 64 IN | TEMPERATURE: 98 F | BODY MASS INDEX: 36.37 KG/M2 | HEART RATE: 65 BPM | SYSTOLIC BLOOD PRESSURE: 140 MMHG | RESPIRATION RATE: 17 BRPM | WEIGHT: 213 LBS

## 2025-07-23 LAB
ABSOLUTE EOSINOPHIL (OHS): 0.01 K/UL
ABSOLUTE MONOCYTE (OHS): 1.02 K/UL (ref 0.3–1)
ABSOLUTE NEUTROPHIL COUNT (OHS): 15.8 K/UL (ref 1.8–7.7)
ALBUMIN SERPL BCP-MCNC: 2.7 G/DL (ref 3.5–5.2)
ALP SERPL-CCNC: 116 UNIT/L (ref 40–150)
ALT SERPL W/O P-5'-P-CCNC: 26 UNIT/L (ref 10–44)
ANION GAP (OHS): 12 MMOL/L (ref 8–16)
AST SERPL-CCNC: 20 UNIT/L (ref 11–45)
BACTERIA BLD CULT: ABNORMAL
BACTERIA BLD CULT: ABNORMAL
BASOPHILS # BLD AUTO: 0.02 K/UL
BASOPHILS NFR BLD AUTO: 0.1 %
BILIRUB SERPL-MCNC: 0.6 MG/DL (ref 0.1–1)
BUN SERPL-MCNC: 42 MG/DL (ref 6–20)
CALCIUM SERPL-MCNC: 10.2 MG/DL (ref 8.7–10.5)
CHLORIDE SERPL-SCNC: 102 MMOL/L (ref 95–110)
CO2 SERPL-SCNC: 20 MMOL/L (ref 23–29)
CREAT SERPL-MCNC: 1.3 MG/DL (ref 0.5–1.4)
ERYTHROCYTE [DISTWIDTH] IN BLOOD BY AUTOMATED COUNT: 26.5 % (ref 11.5–14.5)
GFR SERPLBLD CREATININE-BSD FMLA CKD-EPI: 51 ML/MIN/1.73/M2
GLUCOSE SERPL-MCNC: 142 MG/DL (ref 70–110)
GRAM STN SPEC: ABNORMAL
HCT VFR BLD AUTO: 31.5 % (ref 37–48.5)
HGB BLD-MCNC: 9.5 GM/DL (ref 12–16)
HOLD SPECIMEN: NORMAL
IMM GRANULOCYTES # BLD AUTO: 0.16 K/UL (ref 0–0.04)
IMM GRANULOCYTES NFR BLD AUTO: 0.9 % (ref 0–0.5)
LYMPHOCYTES # BLD AUTO: 0.96 K/UL (ref 1–4.8)
MCH RBC QN AUTO: 20.2 PG (ref 27–31)
MCHC RBC AUTO-ENTMCNC: 30.2 G/DL (ref 32–36)
MCV RBC AUTO: 67 FL (ref 82–98)
NUCLEATED RBC (/100WBC) (OHS): 0 /100 WBC
PLATELET # BLD AUTO: 438 K/UL (ref 150–450)
PMV BLD AUTO: 10.3 FL (ref 9.2–12.9)
POTASSIUM SERPL-SCNC: 3.8 MMOL/L (ref 3.5–5.1)
PROT SERPL-MCNC: 7 GM/DL (ref 6–8.4)
RBC # BLD AUTO: 4.7 M/UL (ref 4–5.4)
RELATIVE EOSINOPHIL (OHS): 0.1 %
RELATIVE LYMPHOCYTE (OHS): 5.3 % (ref 18–48)
RELATIVE MONOCYTE (OHS): 5.7 % (ref 4–15)
RELATIVE NEUTROPHIL (OHS): 87.9 % (ref 38–73)
SODIUM SERPL-SCNC: 134 MMOL/L (ref 136–145)
WBC # BLD AUTO: 17.97 K/UL (ref 3.9–12.7)

## 2025-07-23 PROCEDURE — 80053 COMPREHEN METABOLIC PANEL: CPT | Performed by: SPECIALIST

## 2025-07-23 PROCEDURE — S4991 NICOTINE PATCH NONLEGEND: HCPCS | Performed by: FAMILY MEDICINE

## 2025-07-23 PROCEDURE — 25000003 PHARM REV CODE 250: Performed by: SPECIALIST

## 2025-07-23 PROCEDURE — 25000003 PHARM REV CODE 250

## 2025-07-23 PROCEDURE — 36415 COLL VENOUS BLD VENIPUNCTURE: CPT | Performed by: EMERGENCY MEDICINE

## 2025-07-23 PROCEDURE — 63600175 PHARM REV CODE 636 W HCPCS: Performed by: EMERGENCY MEDICINE

## 2025-07-23 PROCEDURE — 63600175 PHARM REV CODE 636 W HCPCS: Performed by: NURSE PRACTITIONER

## 2025-07-23 PROCEDURE — 25000003 PHARM REV CODE 250: Performed by: EMERGENCY MEDICINE

## 2025-07-23 PROCEDURE — 36415 COLL VENOUS BLD VENIPUNCTURE: CPT | Performed by: SPECIALIST

## 2025-07-23 PROCEDURE — 25000003 PHARM REV CODE 250: Performed by: FAMILY MEDICINE

## 2025-07-23 PROCEDURE — 85025 COMPLETE CBC W/AUTO DIFF WBC: CPT | Performed by: SPECIALIST

## 2025-07-23 PROCEDURE — 87040 BLOOD CULTURE FOR BACTERIA: CPT | Performed by: EMERGENCY MEDICINE

## 2025-07-23 RX ORDER — SODIUM FERRIC GLUCONATE COMPLEX IN SUCROSE 12.5 MG/ML
125 INJECTION INTRAVENOUS DAILY
Status: DISCONTINUED | OUTPATIENT
Start: 2025-07-23 | End: 2025-07-23 | Stop reason: HOSPADM

## 2025-07-23 RX ORDER — PREDNISONE 20 MG/1
20 TABLET ORAL DAILY
Status: DISCONTINUED | OUTPATIENT
Start: 2025-07-23 | End: 2025-07-23 | Stop reason: HOSPADM

## 2025-07-23 RX ORDER — CYANOCOBALAMIN 1000 UG/ML
1000 INJECTION, SOLUTION INTRAMUSCULAR; SUBCUTANEOUS DAILY
Status: DISCONTINUED | OUTPATIENT
Start: 2025-07-23 | End: 2025-07-23 | Stop reason: HOSPADM

## 2025-07-23 RX ORDER — MORPHINE SULFATE 4 MG/ML
1 INJECTION, SOLUTION INTRAMUSCULAR; INTRAVENOUS EVERY 6 HOURS PRN
Status: DISCONTINUED | OUTPATIENT
Start: 2025-07-23 | End: 2025-07-23 | Stop reason: HOSPADM

## 2025-07-23 RX ORDER — LEVOFLOXACIN 750 MG/1
750 TABLET, FILM COATED ORAL DAILY
Status: DISCONTINUED | OUTPATIENT
Start: 2025-07-23 | End: 2025-07-23 | Stop reason: HOSPADM

## 2025-07-23 RX ORDER — ACETAMINOPHEN 500 MG
5000 TABLET ORAL DAILY
Status: DISCONTINUED | OUTPATIENT
Start: 2025-07-23 | End: 2025-07-23 | Stop reason: HOSPADM

## 2025-07-23 RX ADMIN — MUPIROCIN: 20 OINTMENT TOPICAL at 09:07

## 2025-07-23 RX ADMIN — LEVOFLOXACIN 750 MG: 750 TABLET, FILM COATED ORAL at 11:07

## 2025-07-23 RX ADMIN — SUCRALFATE 1 G: 1 SUSPENSION ORAL at 07:07

## 2025-07-23 RX ADMIN — HYDROCODONE BITARTRATE AND ACETAMINOPHEN 1 TABLET: 5; 325 TABLET ORAL at 09:07

## 2025-07-23 RX ADMIN — FAMOTIDINE 20 MG: 20 TABLET, FILM COATED ORAL at 09:07

## 2025-07-23 RX ADMIN — MORPHINE SULFATE 1 MG: 4 INJECTION INTRAVENOUS at 12:07

## 2025-07-23 RX ADMIN — GABAPENTIN 200 MG: 100 CAPSULE ORAL at 09:07

## 2025-07-23 RX ADMIN — MORPHINE SULFATE 1 MG: 4 INJECTION INTRAVENOUS at 11:07

## 2025-07-23 RX ADMIN — GABAPENTIN 200 MG: 100 CAPSULE ORAL at 03:07

## 2025-07-23 RX ADMIN — NICOTINE 1 PATCH: 14 PATCH, EXTENDED RELEASE TRANSDERMAL at 09:07

## 2025-07-23 RX ADMIN — ALUMINUM HYDROXIDE, MAGNESIUM HYDROXIDE, AND DIMETHICONE 30 ML: 200; 20; 200 SUSPENSION ORAL at 07:07

## 2025-07-23 RX ADMIN — HYDROCORTISONE SODIUM SUCCINATE 50 MG: 100 INJECTION, POWDER, FOR SOLUTION INTRAMUSCULAR; INTRAVENOUS at 09:07

## 2025-07-23 RX ADMIN — PREDNISONE 20 MG: 20 TABLET ORAL at 01:07

## 2025-07-23 NOTE — SUBJECTIVE & OBJECTIVE
Interval History: looks and feels better, still c/o back pain. BP stable, getting IV Solucortef. All Cx growing E coli which is pan Sensitive- hence Abx switched to Rocephin. Also Steroid dose reduced and may be switched to PO hydrocortisone tomorrow. WBC elevated further to 21 K, likely sec to the steroids.     Review of Systems   Constitutional:  Positive for activity change, appetite change and fatigue.   HENT: Negative.     Musculoskeletal:  Positive for back pain.   Psychiatric/Behavioral:  The patient is nervous/anxious.    All other systems reviewed and are negative.    Objective:     Vital Signs (Most Recent):  Temp: 97.5 °F (36.4 °C) (07/22/25 1958)  Pulse: 61 (07/22/25 1958)  Resp: 18 (07/22/25 1958)  BP: 116/67 (07/22/25 1958)  SpO2: 97 % (07/22/25 1958) Vital Signs (24h Range):  Temp:  [97.4 °F (36.3 °C)-97.9 °F (36.6 °C)] 97.5 °F (36.4 °C)  Pulse:  [54-71] 61  Resp:  [17-18] 18  SpO2:  [97 %-99 %] 97 %  BP: (101-131)/(57-67) 116/67     Weight: 96.6 kg (213 lb)  Body mass index is 36.56 kg/m².    Intake/Output Summary (Last 24 hours) at 7/22/2025 2200  Last data filed at 7/22/2025 1824  Gross per 24 hour   Intake --   Output 1900 ml   Net -1900 ml         Physical Exam  Vitals and nursing note reviewed.   Constitutional:       General: She is not in acute distress.     Appearance: She is ill-appearing. She is not toxic-appearing or diaphoretic.   HENT:      Right Ear: External ear normal.      Left Ear: External ear normal.      Nose: Nose normal.      Mouth/Throat:      Pharynx: Oropharynx is clear.   Eyes:      Extraocular Movements: Extraocular movements intact.      Conjunctiva/sclera: Conjunctivae normal.      Pupils: Pupils are equal, round, and reactive to light.   Cardiovascular:      Rate and Rhythm: Normal rate.      Pulses: Normal pulses.   Pulmonary:      Effort: Pulmonary effort is normal.   Abdominal:      General: Bowel sounds are normal.      Palpations: Abdomen is soft.  "  Musculoskeletal:         General: Tenderness present.      Cervical back: Normal range of motion and neck supple.   Skin:     General: Skin is warm and dry.      Capillary Refill: Capillary refill takes 2 to 3 seconds.   Neurological:      General: No focal deficit present.      Mental Status: She is alert.      Motor: Weakness present.   Psychiatric:         Mood and Affect: Mood normal.         Behavior: Behavior normal.         Thought Content: Thought content normal.         Judgment: Judgment normal.               Significant Labs: All pertinent labs within the past 24 hours have been reviewed.  Blood Culture: No results for input(s): "LABBLOO" in the last 48 hours.  BMP:   Recent Labs   Lab 07/22/25  0640   *   *   K 4.2      CO2 21*   BUN 35*   CREATININE 1.5*   CALCIUM 9.7     CBC:   Recent Labs   Lab 07/21/25  0454 07/22/25  0639   WBC 15.34* 21.25*   HGB 8.1* 8.3*   HCT 25.9* 27.8*    358       Significant Imaging: I have reviewed all pertinent imaging results/findings within the past 24 hours.  "

## 2025-07-23 NOTE — NURSING
"Pt. Refusing tele monitoring and 0400 vitals.  She is upset, crying, and yelling to "leave her the fuck alone and don't bother coming back".  She is also c/o that her pain is a "14 out of fucking 10".  Breakthrough morphine was given at 0013.  MD aware.   "

## 2025-07-23 NOTE — ASSESSMENT & PLAN NOTE
Sec to prolonged use of steroids for her spinal stenosis  Good response to Solucortef  May switch to Prednisone or or Hydrocortisone in am    Start weaning Solucortef    Now on prednisone

## 2025-07-23 NOTE — ASSESSMENT & PLAN NOTE
Dangers of cigarette smoking were reviewed with patient in detail. Patient was Counseled for 3-10 minutes. Nicotine replacement options were discussed. Nicotine replacement was discussed- not yet   no

## 2025-07-23 NOTE — PROGRESS NOTES
[facility] Medicine  Progress Note    Patient Name: Delicia Moe  MRN: [id number]  Patient Class: IP- Inpatient   Admission Date: 7/20/2025  Length of Stay: 3 days  Attending Physician: Radha Miles MD  Primary Care Provider: Baldev Venegas MD        Subjective     Principal Problem:Septic shock        HPI:  49 y.o. female patient with a PMHx of HTN and spinal stenosis who presents to the Emergency Department for concerns of septic UTI. Pt was sent to the ED from [facility] for ICU admission. The pt initially came for lower back pain. The pain is chronic and has been evaluated by Dr. Montaño (Spine Specialist) who believes the available surgical procedure may not provide pain relief but would stabilize the spine. Symptoms are constant and moderate in severity. No mitigating or exacerbating factors reported. Associated sxs include generalized weakness and fatigue. Patient denies any CP, SOB, fever, SOB, and all other sxs at this time. No further complaints or concerns at this time. She has been on an escalating dose of Neurontin for years and also has been on prednisone 20 mg a day recently reduced to 10 mg a day for over 10 years. She also takes other pain meds like norco but adamantly denies NSAIDS. She also a long hx of heavy menses and has had chronic anemia. She had prolong heavy menses a couple weeks ago and there has been talks about need for hysterectomy. She denies abdominal pain, N/V, Blood in stools or any other source of bleeding.                  Overview/Hospital Course:  49 y.o. female patient with a PMHx of HTN and spinal stenosis who presents to the Emergency Department for concerns of septic UTI. Pt was sent to the ED from [facility] for ICU admission. The pt initially came for lower back pain. The pain is chronic and has been evaluated by Dr. Montaño (Spine Specialist) who believes the available surgical procedure may not provide pain relief but would stabilize the spine.  Symptoms are constant and moderate in severity. No mitigating or exacerbating factors reported. Associated sxs include generalized weakness and fatigue. Patient denies any CP, SOB, fever, SOB, and all other sxs at this time. No further complaints or concerns at this time. She has been on an escalating dose of Neurontin for years and also has been on prednisone 20 mg a day recently reduced to 10 mg a day for over 10 years. She also takes other pain meds like norco but adamantly denies NSAIDS. She also a long hx of heavy menses and has had chronic anemia. She had prolong heavy menses a couple weeks ago and there has been talks about need for hysterectomy. She denies abdominal pain, N/V, Blood in stools or any other source of bleeding.     Hospital/ICU Course:  7/21 No hemodynamic or resp issues.    Hosp Med: 7/21/25: pt seen and examined in the room. C/o back pain but otherwise comfortable. Admitted to ICU yesterday w Septic Shock from UTI. She has been steroid dependent for a long time hence started on stress doses of steroids with stabilization of her BP. She remained hemodynamically stable, oxygenating well, transferred out of ICU to Hosp Med today. Later her Blood Cx came back positive for GNR.     7/22- looks and feels better, still c/o back pain. BP stable, getting IV Solucortef. All Cx growing E coli which is pan Sensitive- hence Abx switched to Rocephin. Also Steroid dose reduced and may be switched to PO hydrocortisone tomorrow. WBC elevated further to 21 K, likely sec to the steroids.     7/23- looks and feels better but c/o LBP and wants to go home as she can't sleep here. She sleeps in a recliner at home and she does not like the recliner in her room, can't sleep on it. Otherwise doing better, Blood Cx repeated. WBC coming down since lowering the Solucortef and it was changed to oral Prednisone. E Coli is pan sensitive and abx changed to oral Levaquin after d/w ID- Dr. Matt. Pt persuaded to stay another  day or so but she might leave AMA. Nursing looking for another recliner in the hosp.              Interval History: looks and feels better but c/o LBP and wants to go home as she can't sleep here. She sleeps in a recliner at home and she does not like the recliner in her room, can't sleep on it. Otherwise doing better, Blood Cx repeated. WBC coming down since lowering the Solucortef and it was changed to oral Prednisone. E Coli is pan sensitive and abx changed to oral Levaquin after d/w ID- Dr. Matt. Pt persuaded to stay another day or so but she might leave AMA. Nursing looking for another recliner in the hosp.        Review of Systems   Constitutional:  Positive for activity change, appetite change and fatigue.   HENT: Negative.     Musculoskeletal:  Positive for back pain.   Psychiatric/Behavioral:  The patient is nervous/anxious.    All other systems reviewed and are negative.    Objective:     Vital Signs (Most Recent):  Temp: 97.8 °F (36.6 °C) (07/23/25 1619)  Pulse: 65 (07/23/25 1619)  Resp: 17 (07/23/25 1619)  BP: (!) 140/66 (07/23/25 1619)  SpO2: 97 % (07/23/25 1619) Vital Signs (24h Range):  Temp:  [97.4 °F (36.3 °C)-97.8 °F (36.6 °C)] 97.8 °F (36.6 °C)  Pulse:  [56-65] 65  Resp:  [16-18] 17  SpO2:  [96 %-99 %] 97 %  BP: (116-140)/(61-67) 140/66     Weight: 96.6 kg (213 lb)  Body mass index is 36.56 kg/m².    Intake/Output Summary (Last 24 hours) at 7/23/2025 1839  Last data filed at 7/23/2025 1400  Gross per 24 hour   Intake 120 ml   Output 300 ml   Net -180 ml         Physical Exam  Vitals and nursing note reviewed.   Constitutional:       General: She is not in acute distress.     Appearance: She is ill-appearing. She is not toxic-appearing or diaphoretic.   HENT:      Head: Normocephalic and atraumatic.      Right Ear: External ear normal.      Left Ear: External ear normal.      Nose: Nose normal.      Mouth/Throat:      Pharynx: Oropharynx is clear.   Eyes:      Extraocular Movements: Extraocular  movements intact.      Conjunctiva/sclera: Conjunctivae normal.      Pupils: Pupils are equal, round, and reactive to light.   Cardiovascular:      Rate and Rhythm: Normal rate and regular rhythm.      Pulses: Normal pulses.      Heart sounds: Normal heart sounds.   Pulmonary:      Effort: Pulmonary effort is normal.      Breath sounds: Normal breath sounds.   Abdominal:      General: Abdomen is flat. Bowel sounds are normal. There is no distension.      Palpations: Abdomen is soft.      Tenderness: There is no abdominal tenderness. There is no guarding.   Musculoskeletal:         General: Tenderness present.      Cervical back: Normal range of motion and neck supple.   Skin:     General: Skin is warm and dry.      Capillary Refill: Capillary refill takes less than 2 seconds.   Neurological:      General: No focal deficit present.      Mental Status: She is alert and oriented to person, place, and time.      Motor: No weakness.   Psychiatric:         Mood and Affect: Mood normal.         Behavior: Behavior normal.         Thought Content: Thought content normal.         Judgment: Judgment normal.               Significant Labs: All pertinent labs within the past 24 hours have been reviewed.  Blood Culture:   BMP:   Recent Labs   Lab 07/23/25  0941   *   *   K 3.8      CO2 20*   BUN 42*   CREATININE 1.3   CALCIUM 10.2     CBC:   Recent Labs   Lab 07/22/25  0639 07/23/25  0941   WBC 21.25* 17.97*   HGB 8.3* 9.5*   HCT 27.8* 31.5*    438     Magnesium:     Significant Imaging: I have reviewed all pertinent imaging results/findings within the past 24 hours.      Assessment & Plan  Septic shock sec to E Coli UTI w bacteremia  This patient has shock. The type of shock is distributive due to sepsis. The patient has the following evidence of shock: persistent hypotension. The patient will be admitted to an intensive care unit, they will be treated with IVF, Solucortef  May need Pressors    BP  improved w Solucortef- continued    Clinically improving. Repeat blood Cx in am  May need ID consult  .  Resolved, now started on oral Levaquin  Solucortef changed to PO prednisone  Acute pyelonephritis  Cont Rocephin- clinically better    May need ID consult    Improving- no dysuria or flank pain  Cont levaquin  Tobacco dependency  Dangers of cigarette smoking were reviewed with patient in detail. Patient was Counseled for 3-10 minutes. Nicotine replacement options were discussed. Nicotine replacement was discussed- not yet  Adrenal insufficiency  Sec to prolonged use of steroids for her spinal stenosis  Good response to Solucortef  May switch to Prednisone or or Hydrocortisone in am    Start weaning Solucortef    Now on prednisone    Chronic pain syndrome  Cont Norco and gabapentin as BP permits        Severe anemia  Anemia is likely due to chronic blood loss and Iron deficiency. Most recent hemoglobin and hematocrit are listed below.  Recent Labs     07/21/25  0454 07/22/25  0639 07/23/25  0941   HGB 8.1* 8.3* 9.5*   HCT 25.9* 27.8* 31.5*     Plan  - Monitor serial CBC: Daily  - Transfuse PRBC if patient becomes hemodynamically unstable, symptomatic or H/H drops below 7/21.  - Patient has received 2 units of PRBCs on 7/21  - Patient's anemia is currently improving    H/h improving  - start IV iron, B 12 etc  FERMIN (acute kidney injury)  FERMIN is likely due to pre-renal azotemia due to dehydration. Baseline creatinine is unknown. Most recent creatinine and eGFR are listed below.  Recent Labs     07/21/25  0454 07/22/25  0640 07/23/25  0941   CREATININE 1.7* 1.5* 1.3   EGFRNORACEVR 37* 43* 51*      Plan  - FERMIN is improving  - Avoid nephrotoxins and renally dose meds for GFR listed above  - Monitor urine output, serial BMP, and adjust therapy as needed  - continue IVF    Improving, d/c IVF soon    Improving- cont IVF  VTE Risk Mitigation (From admission, onward)           Ordered     Place sequential compression device   Until discontinued         07/21/25 1035                    Discharge Planning   NICOLETTE: 7/25/2025     Code Status: Full Code   Medical Readiness for Discharge Date:   Discharge Plan A: Skilled Nursing Facility        Please place Justification for DME      Radha Miles MD  Department of Hospital Medicine   '[facility]

## 2025-07-23 NOTE — ASSESSMENT & PLAN NOTE
FERMIN is likely due to pre-renal azotemia due to dehydration. Baseline creatinine is unknown. Most recent creatinine and eGFR are listed below.  Recent Labs     07/21/25  0454 07/22/25  0640 07/23/25  0941   CREATININE 1.7* 1.5* 1.3   EGFRNORACEVR 37* 43* 51*      Plan  - FERMIN is improving  - Avoid nephrotoxins and renally dose meds for GFR listed above  - Monitor urine output, serial BMP, and adjust therapy as needed  - continue IVF    Improving, d/c IVF soon    Improving- cont IVF

## 2025-07-23 NOTE — ASSESSMENT & PLAN NOTE
FERMIN is likely due to pre-renal azotemia due to dehydration. Baseline creatinine is unknown. Most recent creatinine and eGFR are listed below.  Recent Labs     07/20/25  0655 07/21/25  0454 07/22/25  0640   CREATININE 3.4* 1.7* 1.5*   EGFRNORACEVR 16* 37* 43*      Plan  - FERMIN is improving  - Avoid nephrotoxins and renally dose meds for GFR listed above  - Monitor urine output, serial BMP, and adjust therapy as needed  - continue IVF    Improving, d/c IVF soon

## 2025-07-23 NOTE — ASSESSMENT & PLAN NOTE
Cont Rocephin- clinically better    May need ID consult    Improving- no dysuria or flank pain  Cont levaquin

## 2025-07-23 NOTE — ASSESSMENT & PLAN NOTE
This patient has shock. The type of shock is distributive due to sepsis. The patient has the following evidence of shock: persistent hypotension. The patient will be admitted to an intensive care unit, they will be treated with IVF, Solucortef  May need Pressors    BP improved w Solucortef- continued    Clinically improving. Repeat blood Cx in am  May need ID consult  .  Resolved, now started on oral Levaquin  Solucortef changed to PO prednisone

## 2025-07-23 NOTE — ASSESSMENT & PLAN NOTE
Anemia is likely due to chronic blood loss and Iron deficiency. Most recent hemoglobin and hematocrit are listed below.  Recent Labs     07/20/25  1640 07/21/25  0454 07/22/25  0639   HGB 6.4* 8.1* 8.3*   HCT 22.2* 25.9* 27.8*     Plan  - Monitor serial CBC: Daily  - Transfuse PRBC if patient becomes hemodynamically unstable, symptomatic or H/H drops below 7/21.  - Patient has received 2 units of PRBCs on 7/21  - Patient's anemia is currently improving  -

## 2025-07-23 NOTE — PROGRESS NOTES
[facility] Medicine  Progress Note    Patient Name: Delicia Moe  MRN: [id number]  Patient Class: IP- Inpatient   Admission Date: 7/20/2025  Length of Stay: 2 days  Attending Physician: Radha Miles MD  Primary Care Provider: Baldev Venegas MD        Subjective     Principal Problem:Septic shock        HPI:  49 y.o. female patient with a PMHx of HTN and spinal stenosis who presents to the Emergency Department for concerns of septic UTI. Pt was sent to the ED from [facility] for ICU admission. The pt initially came for lower back pain. The pain is chronic and has been evaluated by Dr. Montaño (Spine Specialist) who believes the available surgical procedure may not provide pain relief but would stabilize the spine. Symptoms are constant and moderate in severity. No mitigating or exacerbating factors reported. Associated sxs include generalized weakness and fatigue. Patient denies any CP, SOB, fever, SOB, and all other sxs at this time. No further complaints or concerns at this time. She has been on an escalating dose of Neurontin for years and also has been on prednisone 20 mg a day recently reduced to 10 mg a day for over 10 years. She also takes other pain meds like norco but adamantly denies NSAIDS. She also a long hx of heavy menses and has had chronic anemia. She had prolong heavy menses a couple weeks ago and there has been talks about need for hysterectomy. She denies abdominal pain, N/V, Blood in stools or any other source of bleeding.                  Overview/Hospital Course:  49 y.o. female patient with a PMHx of HTN and spinal stenosis who presents to the Emergency Department for concerns of septic UTI. Pt was sent to the ED from [facility] for ICU admission. The pt initially came for lower back pain. The pain is chronic and has been evaluated by Dr. Montaño (Spine Specialist) who believes the available surgical procedure may not provide pain relief but would stabilize the spine.  Symptoms are constant and moderate in severity. No mitigating or exacerbating factors reported. Associated sxs include generalized weakness and fatigue. Patient denies any CP, SOB, fever, SOB, and all other sxs at this time. No further complaints or concerns at this time. She has been on an escalating dose of Neurontin for years and also has been on prednisone 20 mg a day recently reduced to 10 mg a day for over 10 years. She also takes other pain meds like norco but adamantly denies NSAIDS. She also a long hx of heavy menses and has had chronic anemia. She had prolong heavy menses a couple weeks ago and there has been talks about need for hysterectomy. She denies abdominal pain, N/V, Blood in stools or any other source of bleeding.     Hospital/ICU Course:  7/21 No hemodynamic or resp issues.    Hosp Med: 7/21/25: pt seen and examined in the room. C/o back pain but otherwise comfortable. Admitted to ICU yesterday w Septic Shock from UTI. She has been steroid dependent for a long time hence started on stress doses of steroids with stabilization of her BP. She remained hemodynamically stable, oxygenating well, transferred out of ICU to Hosp Med today. Later her Blood Cx came back positive for GNR.     7/22- looks and feels better, still c/o back pain. BP stable, getting IV Solucortef. All Cx growing E coli which is pan Sensitive- hence Abx switched to Rocephin. Also Steroid dose reduced and may be switched to PO hydrocortisone tomorrow. WBC elevated further to 21 K, likely sec to the steroids.                   Interval History: looks and feels better, still c/o back pain. BP stable, getting IV Solucortef. All Cx growing E coli which is pan Sensitive- hence Abx switched to Rocephin. Also Steroid dose reduced and may be switched to PO hydrocortisone tomorrow. WBC elevated further to 21 K, likely sec to the steroids.     Review of Systems   Constitutional:  Positive for activity change, appetite change and fatigue.    HENT: Negative.     Musculoskeletal:  Positive for back pain.   Psychiatric/Behavioral:  The patient is nervous/anxious.    All other systems reviewed and are negative.    Objective:     Vital Signs (Most Recent):  Temp: 97.5 °F (36.4 °C) (07/22/25 1958)  Pulse: 61 (07/22/25 1958)  Resp: 18 (07/22/25 1958)  BP: 116/67 (07/22/25 1958)  SpO2: 97 % (07/22/25 1958) Vital Signs (24h Range):  Temp:  [97.4 °F (36.3 °C)-97.9 °F (36.6 °C)] 97.5 °F (36.4 °C)  Pulse:  [54-71] 61  Resp:  [17-18] 18  SpO2:  [97 %-99 %] 97 %  BP: (101-131)/(57-67) 116/67     Weight: 96.6 kg (213 lb)  Body mass index is 36.56 kg/m².    Intake/Output Summary (Last 24 hours) at 7/22/2025 2200  Last data filed at 7/22/2025 1824  Gross per 24 hour   Intake --   Output 1900 ml   Net -1900 ml         Physical Exam  Vitals and nursing note reviewed.   Constitutional:       General: She is not in acute distress.     Appearance: She is ill-appearing. She is not toxic-appearing or diaphoretic.   HENT:      Right Ear: External ear normal.      Left Ear: External ear normal.      Nose: Nose normal.      Mouth/Throat:      Pharynx: Oropharynx is clear.   Eyes:      Extraocular Movements: Extraocular movements intact.      Conjunctiva/sclera: Conjunctivae normal.      Pupils: Pupils are equal, round, and reactive to light.   Cardiovascular:      Rate and Rhythm: Normal rate.      Pulses: Normal pulses.   Pulmonary:      Effort: Pulmonary effort is normal.   Abdominal:      General: Bowel sounds are normal.      Palpations: Abdomen is soft.   Musculoskeletal:         General: Tenderness present.      Cervical back: Normal range of motion and neck supple.   Skin:     General: Skin is warm and dry.      Capillary Refill: Capillary refill takes 2 to 3 seconds.   Neurological:      General: No focal deficit present.      Mental Status: She is alert.      Motor: Weakness present.   Psychiatric:         Mood and Affect: Mood normal.         Behavior: Behavior  "normal.         Thought Content: Thought content normal.         Judgment: Judgment normal.               Significant Labs: All pertinent labs within the past 24 hours have been reviewed.  Blood Culture: No results for input(s): "LABBLOO" in the last 48 hours.  BMP:   Recent Labs   Lab 07/22/25  0640   *   *   K 4.2      CO2 21*   BUN 35*   CREATININE 1.5*   CALCIUM 9.7     CBC:   Recent Labs   Lab 07/21/25  0454 07/22/25  0639   WBC 15.34* 21.25*   HGB 8.1* 8.3*   HCT 25.9* 27.8*    358       Significant Imaging: I have reviewed all pertinent imaging results/findings within the past 24 hours.      Assessment & Plan  Septic shock sec to E Coli UTI w bacteremia  This patient has shock. The type of shock is distributive due to sepsis. The patient has the following evidence of shock: persistent hypotension. The patient will be admitted to an intensive care unit, they will be treated with IVF, Solucortef  May need Pressors    BP improved w Solucortef- continued    Clinically improving. Repeat blood Cx in am  May need ID consult  .  Acute pyelonephritis  Cont Rocephin- clinically better    May need ID consult    Tobacco dependency  Dangers of cigarette smoking were reviewed with patient in detail. Patient was Counseled for 3-10 minutes. Nicotine replacement options were discussed. Nicotine replacement was discussed- not yet  Adrenal insufficiency  Sec to prolonged use of steroids for her spinal stenosis  Good response to Solucortef  May switch to Prednisone or or Hydrocortisone in am    Start weaning Solucortef    Chronic pain syndrome  Cont Norco and gabapentin as BP permits    Severe anemia  Anemia is likely due to chronic blood loss and Iron deficiency. Most recent hemoglobin and hematocrit are listed below.  Recent Labs     07/20/25  1640 07/21/25  0454 07/22/25  0639   HGB 6.4* 8.1* 8.3*   HCT 22.2* 25.9* 27.8*     Plan  - Monitor serial CBC: Daily  - Transfuse PRBC if patient becomes " hemodynamically unstable, symptomatic or H/H drops below 7/21.  - Patient has received 2 units of PRBCs on 7/21  - Patient's anemia is currently improving  -   FERMIN (acute kidney injury)  FERMIN is likely due to pre-renal azotemia due to dehydration. Baseline creatinine is unknown. Most recent creatinine and eGFR are listed below.  Recent Labs     07/20/25  0655 07/21/25  0454 07/22/25  0640   CREATININE 3.4* 1.7* 1.5*   EGFRNORACEVR 16* 37* 43*      Plan  - FERMIN is improving  - Avoid nephrotoxins and renally dose meds for GFR listed above  - Monitor urine output, serial BMP, and adjust therapy as needed  - continue IVF    Improving, d/c IVF soon  VTE Risk Mitigation (From admission, onward)           Ordered     Place sequential compression device  Until discontinued         07/21/25 1035                    Discharge Planning   NICOLETTE: 7/25/2025     Code Status: Full Code   Medical Readiness for Discharge Date:   Discharge Plan A: Skilled Nursing Facility          SDOH Screening:  The patient was screened for utility difficulties, food insecurity, transport difficulties, housing insecurity, and interpersonal safety and there were no concerns identified this admission.    Please place Justification for DME      Radha Miles MD  Department of Hospital Medicine   [facility]

## 2025-07-23 NOTE — ASSESSMENT & PLAN NOTE
Sec to prolonged use of steroids for her spinal stenosis  Good response to Solucortef  May switch to Prednisone or or Hydrocortisone in am    Start weaning Solucortef

## 2025-07-23 NOTE — ASSESSMENT & PLAN NOTE
Dangers of cigarette smoking were reviewed with patient in detail. Patient was Counseled for 3-10 minutes. Nicotine replacement options were discussed. Nicotine replacement was discussed- not yet

## 2025-07-23 NOTE — SUBJECTIVE & OBJECTIVE
Interval History: looks and feels better but c/o LBP and wants to go home as she can't sleep here. She sleeps in a recliner at home and she does not like the recliner in her room, can't sleep on it. Otherwise doing better, Blood Cx repeated. WBC coming down since lowering the Solucortef and it was changed to oral Prednisone. E Coli is pan sensitive and abx changed to oral Levaquin after d/w ID- Dr. Matt. Pt persuaded to stay another day or so but she might leave AMA. Nursing looking for another recliner in the hosp.        Review of Systems   Constitutional:  Positive for activity change, appetite change and fatigue.   HENT: Negative.     Musculoskeletal:  Positive for back pain.   Psychiatric/Behavioral:  The patient is nervous/anxious.    All other systems reviewed and are negative.    Objective:     Vital Signs (Most Recent):  Temp: 97.8 °F (36.6 °C) (07/23/25 1619)  Pulse: 65 (07/23/25 1619)  Resp: 17 (07/23/25 1619)  BP: (!) 140/66 (07/23/25 1619)  SpO2: 97 % (07/23/25 1619) Vital Signs (24h Range):  Temp:  [97.4 °F (36.3 °C)-97.8 °F (36.6 °C)] 97.8 °F (36.6 °C)  Pulse:  [56-65] 65  Resp:  [16-18] 17  SpO2:  [96 %-99 %] 97 %  BP: (116-140)/(61-67) 140/66     Weight: 96.6 kg (213 lb)  Body mass index is 36.56 kg/m².    Intake/Output Summary (Last 24 hours) at 7/23/2025 1839  Last data filed at 7/23/2025 1400  Gross per 24 hour   Intake 120 ml   Output 300 ml   Net -180 ml         Physical Exam  Vitals and nursing note reviewed.   Constitutional:       General: She is not in acute distress.     Appearance: She is ill-appearing. She is not toxic-appearing or diaphoretic.   HENT:      Head: Normocephalic and atraumatic.      Right Ear: External ear normal.      Left Ear: External ear normal.      Nose: Nose normal.      Mouth/Throat:      Pharynx: Oropharynx is clear.   Eyes:      Extraocular Movements: Extraocular movements intact.      Conjunctiva/sclera: Conjunctivae normal.      Pupils: Pupils are equal,  round, and reactive to light.   Cardiovascular:      Rate and Rhythm: Normal rate and regular rhythm.      Pulses: Normal pulses.      Heart sounds: Normal heart sounds.   Pulmonary:      Effort: Pulmonary effort is normal.      Breath sounds: Normal breath sounds.   Abdominal:      General: Abdomen is flat. Bowel sounds are normal. There is no distension.      Palpations: Abdomen is soft.      Tenderness: There is no abdominal tenderness. There is no guarding.   Musculoskeletal:         General: Tenderness present.      Cervical back: Normal range of motion and neck supple.   Skin:     General: Skin is warm and dry.      Capillary Refill: Capillary refill takes less than 2 seconds.   Neurological:      General: No focal deficit present.      Mental Status: She is alert and oriented to person, place, and time.      Motor: No weakness.   Psychiatric:         Mood and Affect: Mood normal.         Behavior: Behavior normal.         Thought Content: Thought content normal.         Judgment: Judgment normal.               Significant Labs: All pertinent labs within the past 24 hours have been reviewed.  Blood Culture:   BMP:   Recent Labs   Lab 07/23/25  0941   *   *   K 3.8      CO2 20*   BUN 42*   CREATININE 1.3   CALCIUM 10.2     CBC:   Recent Labs   Lab 07/22/25  0639 07/23/25  0941   WBC 21.25* 17.97*   HGB 8.3* 9.5*   HCT 27.8* 31.5*    438     Magnesium:     Significant Imaging: I have reviewed all pertinent imaging results/findings within the past 24 hours.

## 2025-07-23 NOTE — ASSESSMENT & PLAN NOTE
This patient has shock. The type of shock is distributive due to sepsis. The patient has the following evidence of shock: persistent hypotension. The patient will be admitted to an intensive care unit, they will be treated with IVF, Solucortef  May need Pressors    BP improved w Solucortef- continued    Clinically improving. Repeat blood Cx in am  May need ID consult  .

## 2025-07-23 NOTE — PLAN OF CARE
Discussed poc with pt, pt verbalized understanding    Purposeful rounding every 2hours    VS monitored for need of PRN interventions - pt. Refused 0400 VS - MD Pratik aware   Cardiac monitoring in use until 0130, pt SB to NSR, tele monitor # [id number] - at 0130 pt. Removed tele monitor and refused further monitoring.  - MD Pratik aware   Fall precautions in place, remains injury free  Pt denies c/o N/V  PRN pain meds given for c/o pain     Bed locked at lowest position  Call light within reach    Chart check complete  Will cont with POC

## 2025-07-23 NOTE — ASSESSMENT & PLAN NOTE
Anemia is likely due to chronic blood loss and Iron deficiency. Most recent hemoglobin and hematocrit are listed below.  Recent Labs     07/21/25  0454 07/22/25  0639 07/23/25  0941   HGB 8.1* 8.3* 9.5*   HCT 25.9* 27.8* 31.5*     Plan  - Monitor serial CBC: Daily  - Transfuse PRBC if patient becomes hemodynamically unstable, symptomatic or H/H drops below 7/21.  - Patient has received 2 units of PRBCs on 7/21  - Patient's anemia is currently improving    H/h improving  - start IV iron, B 12 etc

## 2025-07-24 LAB
ABO + RH BLD: NORMAL
ABO + RH BLD: NORMAL
BLD PROD TYP BPU: NORMAL
BLD PROD TYP BPU: NORMAL
BLOOD UNIT EXPIRATION DATE: NORMAL
BLOOD UNIT EXPIRATION DATE: NORMAL
BLOOD UNIT TYPE CODE: 5100
BLOOD UNIT TYPE CODE: 5100
CROSSMATCH INTERPRETATION: NORMAL
CROSSMATCH INTERPRETATION: NORMAL
DISPENSE STATUS: NORMAL
DISPENSE STATUS: NORMAL
UNIT NUMBER: NORMAL
UNIT NUMBER: NORMAL

## 2025-07-28 LAB — BACTERIA BLD CULT: NORMAL
